# Patient Record
Sex: FEMALE | Race: WHITE | NOT HISPANIC OR LATINO | Employment: FULL TIME | URBAN - METROPOLITAN AREA
[De-identification: names, ages, dates, MRNs, and addresses within clinical notes are randomized per-mention and may not be internally consistent; named-entity substitution may affect disease eponyms.]

---

## 2017-01-20 ENCOUNTER — OFFICE VISIT - HEALTHEAST (OUTPATIENT)
Dept: PHYSICAL THERAPY | Facility: REHABILITATION | Age: 37
End: 2017-01-20

## 2017-01-20 DIAGNOSIS — R29.898 DECREASED RANGE OF MOTION OF NECK: ICD-10-CM

## 2017-01-20 DIAGNOSIS — M54.2 CERVICAL SPINE PAIN: ICD-10-CM

## 2017-02-03 ENCOUNTER — COMMUNICATION - HEALTHEAST (OUTPATIENT)
Dept: FAMILY MEDICINE | Facility: CLINIC | Age: 37
End: 2017-02-03

## 2017-02-04 ENCOUNTER — OFFICE VISIT - HEALTHEAST (OUTPATIENT)
Dept: FAMILY MEDICINE | Facility: CLINIC | Age: 37
End: 2017-02-04

## 2017-02-04 DIAGNOSIS — J02.9 PHARYNGITIS: ICD-10-CM

## 2017-02-04 DIAGNOSIS — H83.03 INFECTION OF THE INNER EAR, BILATERAL: ICD-10-CM

## 2017-02-04 DIAGNOSIS — R52 BODY ACHES: ICD-10-CM

## 2017-02-04 DIAGNOSIS — R11.0 NAUSEA: ICD-10-CM

## 2017-02-04 DIAGNOSIS — R07.0 THROAT PAIN: ICD-10-CM

## 2017-04-15 ENCOUNTER — COMMUNICATION - HEALTHEAST (OUTPATIENT)
Dept: FAMILY MEDICINE | Facility: CLINIC | Age: 37
End: 2017-04-15

## 2017-04-15 DIAGNOSIS — F33.1 MODERATE EPISODE OF RECURRENT MAJOR DEPRESSIVE DISORDER (H): ICD-10-CM

## 2017-11-28 ENCOUNTER — AMBULATORY - HEALTHEAST (OUTPATIENT)
Dept: NURSING | Facility: CLINIC | Age: 37
End: 2017-11-28

## 2017-11-28 DIAGNOSIS — Z23 NEED FOR VACCINATION: ICD-10-CM

## 2017-12-26 LAB
HUMAN PAPILLOMA VIRUS 16 DNA: POSITIVE
HUMAN PAPILLOMA VIRUS 18 DNA: NEGATIVE
HUMAN PAPILLOMA VIRUS FINAL DIAGNOSIS: ABNORMAL
HUMAN PAPILLOMA VIRUS OTHER HR: NEGATIVE
SPECIMEN DESCRIPTION: ABNORMAL

## 2018-01-02 ENCOUNTER — RECORDS - HEALTHEAST (OUTPATIENT)
Dept: ADMINISTRATIVE | Facility: OTHER | Age: 38
End: 2018-01-02

## 2018-01-02 LAB
BKR LAB AP ABNORMAL BLEEDING: NO
BKR LAB AP BIRTH CONTROL/HORMONES: NORMAL
BKR LAB AP CERVICAL APPEARANCE: NORMAL
BKR LAB AP GYN ADEQUACY: NORMAL
BKR LAB AP GYN INTERPRETATION: NORMAL
BKR LAB AP HPV REFLEX: NORMAL
BKR LAB AP LMP: NORMAL
BKR LAB AP PATIENT STATUS: NORMAL
BKR LAB AP PREVIOUS ABNORMAL: NORMAL
BKR LAB AP PREVIOUS NORMAL: 2013
HIGH RISK?: NO
PATH REPORT.COMMENTS IMP SPEC: NORMAL
RESULT FLAG (HE HISTORICAL CONVERSION): NORMAL

## 2018-01-21 ENCOUNTER — COMMUNICATION - HEALTHEAST (OUTPATIENT)
Dept: FAMILY MEDICINE | Facility: CLINIC | Age: 38
End: 2018-01-21

## 2018-01-21 DIAGNOSIS — F33.1 MODERATE EPISODE OF RECURRENT MAJOR DEPRESSIVE DISORDER (H): ICD-10-CM

## 2018-07-28 ENCOUNTER — COMMUNICATION - HEALTHEAST (OUTPATIENT)
Dept: FAMILY MEDICINE | Facility: CLINIC | Age: 38
End: 2018-07-28

## 2018-07-28 DIAGNOSIS — F33.1 MODERATE EPISODE OF RECURRENT MAJOR DEPRESSIVE DISORDER (H): ICD-10-CM

## 2018-07-30 ENCOUNTER — COMMUNICATION - HEALTHEAST (OUTPATIENT)
Dept: SCHEDULING | Facility: CLINIC | Age: 38
End: 2018-07-30

## 2018-08-13 ENCOUNTER — OFFICE VISIT - HEALTHEAST (OUTPATIENT)
Dept: FAMILY MEDICINE | Facility: CLINIC | Age: 38
End: 2018-08-13

## 2018-08-13 DIAGNOSIS — R07.89 CHEST TIGHTNESS: ICD-10-CM

## 2018-08-13 LAB
ATRIAL RATE - MUSE: 58 BPM
DIASTOLIC BLOOD PRESSURE - MUSE: NORMAL MMHG
ERYTHROCYTE [DISTWIDTH] IN BLOOD BY AUTOMATED COUNT: 10.4 % (ref 11–14.5)
HCT VFR BLD AUTO: 40.1 % (ref 35–47)
HGB BLD-MCNC: 13.4 G/DL (ref 12–16)
INTERPRETATION ECG - MUSE: NORMAL
MCH RBC QN AUTO: 32.3 PG (ref 27–34)
MCHC RBC AUTO-ENTMCNC: 33.5 G/DL (ref 32–36)
MCV RBC AUTO: 97 FL (ref 80–100)
P AXIS - MUSE: 5 DEGREES
PLATELET # BLD AUTO: 294 THOU/UL (ref 140–440)
PMV BLD AUTO: 7.2 FL (ref 7–10)
PR INTERVAL - MUSE: 140 MS
QRS DURATION - MUSE: 84 MS
QT - MUSE: 426 MS
QTC - MUSE: 418 MS
R AXIS - MUSE: 33 DEGREES
RBC # BLD AUTO: 4.15 MILL/UL (ref 3.8–5.4)
SYSTOLIC BLOOD PRESSURE - MUSE: NORMAL MMHG
T AXIS - MUSE: 12 DEGREES
VENTRICULAR RATE- MUSE: 58 BPM
WBC: 5.4 THOU/UL (ref 4–11)

## 2018-08-14 LAB
ALBUMIN SERPL-MCNC: 3.7 G/DL (ref 3.5–5)
ALP SERPL-CCNC: 38 U/L (ref 45–120)
ALT SERPL W P-5'-P-CCNC: 15 U/L (ref 0–45)
ANION GAP SERPL CALCULATED.3IONS-SCNC: 12 MMOL/L (ref 5–18)
AST SERPL W P-5'-P-CCNC: 20 U/L (ref 0–40)
BILIRUB SERPL-MCNC: 0.8 MG/DL (ref 0–1)
BUN SERPL-MCNC: 11 MG/DL (ref 8–22)
CALCIUM SERPL-MCNC: 9.6 MG/DL (ref 8.5–10.5)
CHLORIDE BLD-SCNC: 108 MMOL/L (ref 98–107)
CO2 SERPL-SCNC: 19 MMOL/L (ref 22–31)
CREAT SERPL-MCNC: 0.74 MG/DL (ref 0.6–1.1)
GFR SERPL CREATININE-BSD FRML MDRD: >60 ML/MIN/1.73M2
GLUCOSE BLD-MCNC: 87 MG/DL (ref 70–125)
LIPASE SERPL-CCNC: 35 U/L (ref 0–52)
POTASSIUM BLD-SCNC: 4 MMOL/L (ref 3.5–5)
PROT SERPL-MCNC: 7 G/DL (ref 6–8)
SODIUM SERPL-SCNC: 139 MMOL/L (ref 136–145)

## 2018-08-16 ENCOUNTER — COMMUNICATION - HEALTHEAST (OUTPATIENT)
Dept: SCHEDULING | Facility: CLINIC | Age: 38
End: 2018-08-16

## 2018-10-17 ENCOUNTER — AMBULATORY - HEALTHEAST (OUTPATIENT)
Dept: NURSING | Facility: CLINIC | Age: 38
End: 2018-10-17

## 2018-12-23 ENCOUNTER — COMMUNICATION - HEALTHEAST (OUTPATIENT)
Dept: FAMILY MEDICINE | Facility: CLINIC | Age: 38
End: 2018-12-23

## 2018-12-23 DIAGNOSIS — F33.1 MODERATE EPISODE OF RECURRENT MAJOR DEPRESSIVE DISORDER (H): ICD-10-CM

## 2019-01-04 ENCOUNTER — OFFICE VISIT - HEALTHEAST (OUTPATIENT)
Dept: FAMILY MEDICINE | Facility: CLINIC | Age: 39
End: 2019-01-04

## 2019-01-04 DIAGNOSIS — T30.4 CHEMICAL BURN: ICD-10-CM

## 2019-01-04 DIAGNOSIS — L20.9 ATOPIC DERMATITIS, UNSPECIFIED TYPE: ICD-10-CM

## 2019-01-23 ENCOUNTER — OFFICE VISIT - HEALTHEAST (OUTPATIENT)
Dept: FAMILY MEDICINE | Facility: CLINIC | Age: 39
End: 2019-01-23

## 2019-01-23 DIAGNOSIS — F33.1 MODERATE EPISODE OF RECURRENT MAJOR DEPRESSIVE DISORDER (H): ICD-10-CM

## 2019-01-23 DIAGNOSIS — L98.9 SKIN LESION: ICD-10-CM

## 2019-01-23 DIAGNOSIS — F51.04 PSYCHOPHYSIOLOGICAL INSOMNIA: ICD-10-CM

## 2019-01-23 NOTE — ASSESSMENT & PLAN NOTE
This patient has a long history of anxiety and depression and is on a high dose of fluoxetine.  For the past 6 months she has ruminative anxiety driven insomnia with poor sleep hygiene.  This is in the context of her family trying to conceive a child and being unsuccessful.  -We discussed sleep hygiene.  -Trial of trazodone, at least temporarily.  - If she is not realizing some benefit, I would recommend a change from fluoxetine to an alternative medication.  She has not been on sertraline (presumably for Loxitane was started as monotherapy because of a family history of this medicine being effective).
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
## 2019-02-22 ENCOUNTER — COMMUNICATION - HEALTHEAST (OUTPATIENT)
Dept: FAMILY MEDICINE | Facility: CLINIC | Age: 39
End: 2019-02-22

## 2019-04-11 ENCOUNTER — COMMUNICATION - HEALTHEAST (OUTPATIENT)
Dept: FAMILY MEDICINE | Facility: CLINIC | Age: 39
End: 2019-04-11

## 2019-04-11 DIAGNOSIS — F33.1 MODERATE EPISODE OF RECURRENT MAJOR DEPRESSIVE DISORDER (H): ICD-10-CM

## 2019-04-22 ENCOUNTER — COMMUNICATION - HEALTHEAST (OUTPATIENT)
Dept: FAMILY MEDICINE | Facility: CLINIC | Age: 39
End: 2019-04-22

## 2019-04-24 ENCOUNTER — COMMUNICATION - HEALTHEAST (OUTPATIENT)
Dept: FAMILY MEDICINE | Facility: CLINIC | Age: 39
End: 2019-04-24

## 2019-04-24 DIAGNOSIS — Z02.1 PRE-EMPLOYMENT HEALTH SCREENING EXAMINATION: ICD-10-CM

## 2019-05-29 ENCOUNTER — OFFICE VISIT - HEALTHEAST (OUTPATIENT)
Dept: FAMILY MEDICINE | Facility: CLINIC | Age: 39
End: 2019-05-29

## 2019-05-29 DIAGNOSIS — R10.9 ABDOMINAL PAIN: ICD-10-CM

## 2019-05-29 LAB
ALBUMIN UR-MCNC: NEGATIVE MG/DL
APPEARANCE UR: CLEAR
BILIRUB UR QL STRIP: NEGATIVE
COLOR UR AUTO: YELLOW
GLUCOSE UR STRIP-MCNC: NEGATIVE MG/DL
HGB UR QL STRIP: NEGATIVE
KETONES UR STRIP-MCNC: NEGATIVE MG/DL
LEUKOCYTE ESTERASE UR QL STRIP: NEGATIVE
NITRATE UR QL: NEGATIVE
PH UR STRIP: 7 [PH] (ref 5–8)
SP GR UR STRIP: 1.01 (ref 1–1.03)
UROBILINOGEN UR STRIP-ACNC: NORMAL

## 2019-06-21 ENCOUNTER — COMMUNICATION - HEALTHEAST (OUTPATIENT)
Dept: FAMILY MEDICINE | Facility: CLINIC | Age: 39
End: 2019-06-21

## 2019-10-22 ENCOUNTER — COMMUNICATION - HEALTHEAST (OUTPATIENT)
Dept: FAMILY MEDICINE | Facility: CLINIC | Age: 39
End: 2019-10-22

## 2019-10-22 DIAGNOSIS — L98.9 SKIN LESION: ICD-10-CM

## 2019-11-05 ENCOUNTER — RECORDS - HEALTHEAST (OUTPATIENT)
Dept: ADMINISTRATIVE | Facility: OTHER | Age: 39
End: 2019-11-05

## 2019-11-26 ENCOUNTER — HOSPITAL ENCOUNTER (OUTPATIENT)
Dept: RADIOLOGY | Facility: CLINIC | Age: 39
Discharge: HOME | End: 2019-11-26
Attending: OBSTETRICS & GYNECOLOGY | Admitting: RADIOLOGY

## 2019-11-26 DIAGNOSIS — N97.9 INFERTILITY, FEMALE: ICD-10-CM

## 2020-03-02 ENCOUNTER — OFFICE VISIT - HEALTHEAST (OUTPATIENT)
Dept: FAMILY MEDICINE | Facility: CLINIC | Age: 40
End: 2020-03-02

## 2020-03-02 DIAGNOSIS — R10.30 LOWER ABDOMINAL PAIN: ICD-10-CM

## 2020-03-02 ASSESSMENT — PATIENT HEALTH QUESTIONNAIRE - PHQ9: SUM OF ALL RESPONSES TO PHQ QUESTIONS 1-9: 7

## 2020-03-10 ENCOUNTER — COMMUNICATION - HEALTHEAST (OUTPATIENT)
Dept: FAMILY MEDICINE | Facility: CLINIC | Age: 40
End: 2020-03-10

## 2020-03-10 DIAGNOSIS — L30.9 DERMATITIS: ICD-10-CM

## 2020-06-22 ENCOUNTER — COMMUNICATION - HEALTHEAST (OUTPATIENT)
Dept: FAMILY MEDICINE | Facility: CLINIC | Age: 40
End: 2020-06-22

## 2020-06-22 DIAGNOSIS — F33.1 MODERATE EPISODE OF RECURRENT MAJOR DEPRESSIVE DISORDER (H): ICD-10-CM

## 2020-07-28 ENCOUNTER — COMMUNICATION - HEALTHEAST (OUTPATIENT)
Dept: SCHEDULING | Facility: CLINIC | Age: 40
End: 2020-07-28

## 2020-10-24 ENCOUNTER — AMBULATORY - HEALTHEAST (OUTPATIENT)
Dept: NURSING | Facility: CLINIC | Age: 40
End: 2020-10-24

## 2021-01-25 ENCOUNTER — COMMUNICATION - HEALTHEAST (OUTPATIENT)
Dept: SCHEDULING | Facility: CLINIC | Age: 41
End: 2021-01-25

## 2021-01-27 ENCOUNTER — OFFICE VISIT - HEALTHEAST (OUTPATIENT)
Dept: FAMILY MEDICINE | Facility: CLINIC | Age: 41
End: 2021-01-27

## 2021-01-27 ENCOUNTER — AMBULATORY - HEALTHEAST (OUTPATIENT)
Dept: FAMILY MEDICINE | Facility: CLINIC | Age: 41
End: 2021-01-27

## 2021-01-27 DIAGNOSIS — Z20.822 SUSPECTED 2019 NOVEL CORONAVIRUS INFECTION: ICD-10-CM

## 2021-01-27 ASSESSMENT — ANXIETY QUESTIONNAIRES
6. BECOMING EASILY ANNOYED OR IRRITABLE: NOT AT ALL
2. NOT BEING ABLE TO STOP OR CONTROL WORRYING: NOT AT ALL
4. TROUBLE RELAXING: NOT AT ALL
5. BEING SO RESTLESS THAT IT IS HARD TO SIT STILL: NOT AT ALL
3. WORRYING TOO MUCH ABOUT DIFFERENT THINGS: NOT AT ALL
1. FEELING NERVOUS, ANXIOUS, OR ON EDGE: NOT AT ALL
7. FEELING AFRAID AS IF SOMETHING AWFUL MIGHT HAPPEN: NOT AT ALL
GAD7 TOTAL SCORE: 0

## 2021-01-27 ASSESSMENT — PATIENT HEALTH QUESTIONNAIRE - PHQ9: SUM OF ALL RESPONSES TO PHQ QUESTIONS 1-9: 1

## 2021-01-28 ENCOUNTER — COMMUNICATION - HEALTHEAST (OUTPATIENT)
Dept: SCHEDULING | Facility: CLINIC | Age: 41
End: 2021-01-28

## 2021-01-28 ENCOUNTER — COMMUNICATION - HEALTHEAST (OUTPATIENT)
Dept: FAMILY MEDICINE | Facility: CLINIC | Age: 41
End: 2021-01-28

## 2021-01-28 LAB
SARS-COV-2 PCR COMMENT: ABNORMAL
SARS-COV-2 RNA SPEC QL NAA+PROBE: POSITIVE
SARS-COV-2 VIRUS SPECIMEN SOURCE: ABNORMAL

## 2021-01-29 ENCOUNTER — OFFICE VISIT - HEALTHEAST (OUTPATIENT)
Dept: FAMILY MEDICINE | Facility: CLINIC | Age: 41
End: 2021-01-29

## 2021-01-29 DIAGNOSIS — F33.1 MODERATE EPISODE OF RECURRENT MAJOR DEPRESSIVE DISORDER (H): ICD-10-CM

## 2021-01-29 DIAGNOSIS — F51.04 PSYCHOPHYSIOLOGICAL INSOMNIA: ICD-10-CM

## 2021-01-29 DIAGNOSIS — F10.230 ALCOHOL DEPENDENCE WITH UNCOMPLICATED WITHDRAWAL (H): ICD-10-CM

## 2021-01-29 NOTE — ASSESSMENT & PLAN NOTE
This patient is in withdrawal from alcohol.  She had been drinking at least 7 drinks of vodka every other day for the past couple of months.  Most recent consumption approximately 48 hours.  She also is COVID-19 at this time.  She is planning on starting some form of outpatient chemical dependency therapy early next week.  She is looking for some assistance with cravings and asked specifically about naltrexone.  She also feels as though she has very little motivation.  We discussed that this is likely multifactorial.  He has been taking Prozac and unclear if this is been helpful.  She understands that she is at a an extremely high dose of fluoxetine at this time.  -No referrals were placed for chemical dependency in part because the patient already has her own resources.  She needs additional assistance, close referral for chemical dependency counseling based on phone call or email.  -Discussed augmentation of her current mental health plan.  We will add Wellbutrin which hopefully will help with some cravings.  -We will also add naltrexone.  -2-week follow-up recommended  -Sleeplessness.  Likely associated with acute withdrawal.  Previously she is done well on trazodone.  We did discuss briefly that she may benefit from benzodiazepine class medications.  We will withhold these medicines for now based on patient's preference.  -We also discussed Antabuse and Campral as medicines that might help with maintenance.  We will revisit at a future visit.

## 2021-02-12 ENCOUNTER — OFFICE VISIT - HEALTHEAST (OUTPATIENT)
Dept: FAMILY MEDICINE | Facility: CLINIC | Age: 41
End: 2021-02-12

## 2021-02-12 DIAGNOSIS — F10.230 ALCOHOL DEPENDENCE WITH UNCOMPLICATED WITHDRAWAL (H): ICD-10-CM

## 2021-02-12 DIAGNOSIS — F33.1 MODERATE EPISODE OF RECURRENT MAJOR DEPRESSIVE DISORDER (H): ICD-10-CM

## 2021-02-12 DIAGNOSIS — F51.04 PSYCHOPHYSIOLOGICAL INSOMNIA: ICD-10-CM

## 2021-02-12 ASSESSMENT — ANXIETY QUESTIONNAIRES
3. WORRYING TOO MUCH ABOUT DIFFERENT THINGS: NOT AT ALL
4. TROUBLE RELAXING: NOT AT ALL
2. NOT BEING ABLE TO STOP OR CONTROL WORRYING: NOT AT ALL
7. FEELING AFRAID AS IF SOMETHING AWFUL MIGHT HAPPEN: NOT AT ALL
6. BECOMING EASILY ANNOYED OR IRRITABLE: NOT AT ALL
5. BEING SO RESTLESS THAT IT IS HARD TO SIT STILL: NOT AT ALL
GAD7 TOTAL SCORE: 0
1. FEELING NERVOUS, ANXIOUS, OR ON EDGE: NOT AT ALL

## 2021-02-12 ASSESSMENT — PATIENT HEALTH QUESTIONNAIRE - PHQ9: SUM OF ALL RESPONSES TO PHQ QUESTIONS 1-9: 2

## 2021-02-12 NOTE — ASSESSMENT & PLAN NOTE
This patient is doing well in comparison to when we spoke on 1/29.  She has had no alcohol since approximately 1/27 she is doing 12 hours of outpatient therapy per week (in the evenings through [facility]).  She has had one episode of nausea which might be attributed to naltrexone.  She thinks that Wellbutrin has helped improve clarity of thought.  We discussed the possibility that she might have some symptoms of excessive serotonergic activity as result of the high dose of fluoxetine.  -Trial fluoxetine at 60 mg.  20 mg capsules sent to pharmacy to help facilitate the slow downward dosing of fluoxetine.  -Continue Wellbutrin SR at 100 mg twice daily.  -Take naltrexone once daily.  She will try taking 50 mg and continue.  She believes that this has helped with craving.  -Decreased use of trazodone.  Use as needed.

## 2021-02-15 ENCOUNTER — COMMUNICATION - HEALTHEAST (OUTPATIENT)
Dept: FAMILY MEDICINE | Facility: CLINIC | Age: 41
End: 2021-02-15

## 2021-02-15 DIAGNOSIS — F10.230 ALCOHOL DEPENDENCE WITH UNCOMPLICATED WITHDRAWAL (H): ICD-10-CM

## 2021-02-15 DIAGNOSIS — F33.1 MODERATE EPISODE OF RECURRENT MAJOR DEPRESSIVE DISORDER (H): ICD-10-CM

## 2021-02-15 DIAGNOSIS — F51.04 PSYCHOPHYSIOLOGICAL INSOMNIA: ICD-10-CM

## 2021-02-20 ENCOUNTER — COMMUNICATION - HEALTHEAST (OUTPATIENT)
Dept: FAMILY MEDICINE | Facility: CLINIC | Age: 41
End: 2021-02-20

## 2021-02-20 DIAGNOSIS — F10.230 ALCOHOL DEPENDENCE WITH UNCOMPLICATED WITHDRAWAL (H): ICD-10-CM

## 2021-02-20 DIAGNOSIS — F33.1 MODERATE EPISODE OF RECURRENT MAJOR DEPRESSIVE DISORDER (H): ICD-10-CM

## 2021-02-20 DIAGNOSIS — F51.04 PSYCHOPHYSIOLOGICAL INSOMNIA: ICD-10-CM

## 2021-03-24 ENCOUNTER — COMMUNICATION - HEALTHEAST (OUTPATIENT)
Dept: FAMILY MEDICINE | Facility: CLINIC | Age: 41
End: 2021-03-24

## 2021-03-24 DIAGNOSIS — F10.230 ALCOHOL DEPENDENCE WITH UNCOMPLICATED WITHDRAWAL (H): ICD-10-CM

## 2021-03-24 DIAGNOSIS — F33.1 MODERATE EPISODE OF RECURRENT MAJOR DEPRESSIVE DISORDER (H): ICD-10-CM

## 2021-03-24 DIAGNOSIS — F51.04 PSYCHOPHYSIOLOGICAL INSOMNIA: ICD-10-CM

## 2021-03-30 ENCOUNTER — OFFICE VISIT - HEALTHEAST (OUTPATIENT)
Dept: FAMILY MEDICINE | Facility: CLINIC | Age: 41
End: 2021-03-30

## 2021-03-30 ENCOUNTER — COMMUNICATION - HEALTHEAST (OUTPATIENT)
Dept: FAMILY MEDICINE | Facility: CLINIC | Age: 41
End: 2021-03-30

## 2021-03-30 DIAGNOSIS — F10.230 ALCOHOL DEPENDENCE WITH UNCOMPLICATED WITHDRAWAL (H): ICD-10-CM

## 2021-03-30 DIAGNOSIS — F33.1 MODERATE EPISODE OF RECURRENT MAJOR DEPRESSIVE DISORDER (H): ICD-10-CM

## 2021-03-30 DIAGNOSIS — F51.04 PSYCHOPHYSIOLOGICAL INSOMNIA: ICD-10-CM

## 2021-03-30 ASSESSMENT — ANXIETY QUESTIONNAIRES
GAD7 TOTAL SCORE: 12
1. FEELING NERVOUS, ANXIOUS, OR ON EDGE: MORE THAN HALF THE DAYS
6. BECOMING EASILY ANNOYED OR IRRITABLE: NEARLY EVERY DAY
3. WORRYING TOO MUCH ABOUT DIFFERENT THINGS: MORE THAN HALF THE DAYS
7. FEELING AFRAID AS IF SOMETHING AWFUL MIGHT HAPPEN: SEVERAL DAYS
2. NOT BEING ABLE TO STOP OR CONTROL WORRYING: MORE THAN HALF THE DAYS
5. BEING SO RESTLESS THAT IT IS HARD TO SIT STILL: SEVERAL DAYS
4. TROUBLE RELAXING: SEVERAL DAYS

## 2021-03-30 ASSESSMENT — PATIENT HEALTH QUESTIONNAIRE - PHQ9: SUM OF ALL RESPONSES TO PHQ QUESTIONS 1-9: 10

## 2021-03-30 NOTE — ASSESSMENT & PLAN NOTE
Anxious symptoms have been worse recently with more cravings.  She thinks that she has more energy and attributes this to the decreaseof the fluoxetine dose (was 80 mg and is currently 60 mg).  -Continue flecainide 60 mg.  -Discontinue Wellbutrin SR.  Start Wellbutrin  mg.  -Continue plan for maintenance of sobriety.  -Follow-up in 1 month?

## 2021-03-30 NOTE — ASSESSMENT & PLAN NOTE
"She continues to work to maintain sobriety.  She describes having \"1 slip up\" but she is back at maintaining sobriety.  Continue treatment.  Continue naltrexone.  Unclear benefit of bupropion as relates to cravings.  Cravings have been higher recently as she goes further into her treatment plan.  "

## 2021-04-21 ENCOUNTER — COMMUNICATION - HEALTHEAST (OUTPATIENT)
Dept: FAMILY MEDICINE | Facility: CLINIC | Age: 41
End: 2021-04-21

## 2021-04-21 DIAGNOSIS — F33.1 MODERATE EPISODE OF RECURRENT MAJOR DEPRESSIVE DISORDER (H): ICD-10-CM

## 2021-04-21 DIAGNOSIS — F10.230 ALCOHOL DEPENDENCE WITH UNCOMPLICATED WITHDRAWAL (H): ICD-10-CM

## 2021-05-03 ENCOUNTER — TRANSFERRED RECORDS (OUTPATIENT)
Dept: HEALTH INFORMATION MANAGEMENT | Facility: CLINIC | Age: 41
End: 2021-05-03

## 2021-05-17 ENCOUNTER — COMMUNICATION - HEALTHEAST (OUTPATIENT)
Dept: FAMILY MEDICINE | Facility: CLINIC | Age: 41
End: 2021-05-17

## 2021-05-17 DIAGNOSIS — F10.230 ALCOHOL DEPENDENCE WITH UNCOMPLICATED WITHDRAWAL (H): ICD-10-CM

## 2021-05-17 DIAGNOSIS — F33.1 MODERATE EPISODE OF RECURRENT MAJOR DEPRESSIVE DISORDER (H): ICD-10-CM

## 2021-05-27 ASSESSMENT — PATIENT HEALTH QUESTIONNAIRE - PHQ9
SUM OF ALL RESPONSES TO PHQ QUESTIONS 1-9: 1
SUM OF ALL RESPONSES TO PHQ QUESTIONS 1-9: 7
SUM OF ALL RESPONSES TO PHQ QUESTIONS 1-9: 2
SUM OF ALL RESPONSES TO PHQ QUESTIONS 1-9: 10

## 2021-05-27 NOTE — TELEPHONE ENCOUNTER
Refill Approved    Rx renewed per Medication Renewal Policy. Medication was last renewed on 12/31/18.    Thalia Quach, [facility] Triage/Med Refill 4/11/2019     Requested Prescriptions   Pending Prescriptions Disp Refills     FLUoxetine (PROZAC) 40 MG capsule [Pharmacy Med Name: FLUOXETINE HCL 40 MG CAPSULE] 60 capsule 0     Sig: TAKE 2 CAPSULES (80 MG TOTAL) BY MOUTH DAILY. CLINIC VISIT REQUIRED BEFORE ADDITIONAL REFILLS.       SSRI Refill Protocol  Passed - 4/11/2019  2:31 AM        Passed - PCP or prescribing provider visit in last year     Last office visit with prescriber/PCP: 1/23/2019 Mk Valenzuela MD OR same dept: 1/23/2019 Mk Valenzuela MD OR same specialty: 1/23/2019 Mk Valenzuela MD  Last physical: Visit date not found Last MTM visit: Visit date not found   Next visit within 3 mo: Visit date not found  Next physical within 3 mo: Visit date not found  Prescriber OR PCP: Mk Valenzuela MD  Last diagnosis associated with med order: 1. Moderate episode of recurrent major depressive disorder (H)  - FLUoxetine (PROZAC) 40 MG capsule [Pharmacy Med Name: FLUOXETINE HCL 40 MG CAPSULE]; Take 2 capsules (80 mg total) by mouth daily. Clinic visit required before additional refills.  Dispense: 60 capsule; Refill: 0    If protocol passes may refill for 12 months if within 3 months of last provider visit (or a total of 15 months).

## 2021-05-28 ASSESSMENT — ANXIETY QUESTIONNAIRES
GAD7 TOTAL SCORE: 0
GAD7 TOTAL SCORE: 0
GAD7 TOTAL SCORE: 12

## 2021-05-29 NOTE — PROGRESS NOTES
"Assessment/Plan:    Leila was seen today for abdominal pain.    Diagnoses and all orders for this visit:    Abdominal pain: Symptoms are of short duration and have not been present today, at least not in the same character.  Mild tenderness in that area on exam which could be consistent with muscular etiology.  Urinalysis without abnormalities.  The patient is due for her.  Later today.  Discussed bowel movement hygiene does not appear to be comorbid constipation.  Differential: Muscular etiology versus gynecologic (ovaries?)  Versus other.  If not improving, consider transvaginal ultrasound to evaluate the ovaries the pelvic anatomy.  Pelvic floor dysfunction is also on the differential.  The patient said that if her symptoms recur she will see her gynecologist for an in-depth gynecologic exam.  -     Urinalysis-UC if Indicated       Return in about 1 month (around 6/26/2019) for recheck if not improving.    Mk Valenzuela MD  _______________________________    Chief Complaint   Patient presents with     Abdominal Pain     \" off and on\" pt notices when she has a full bladder x 1.5 weeks      Subjective: Leila Muir is a 38 y.o. year old female who I have seen in clinic before who presents with the following acute complaint(s):    Cramping sensation:   - worse with full bladder.  Relieved with voiding. No dysuria.  No urinary urgency or frequency.  Similar sensation with BM.  Left lower quadrant pain.  BMs: twice per day.  \"Normal\"  [place] type IV.  No symptoms today.  No recent antibiotics.  No fevers, no chills.  No related to menstrual cycle.      ROS: Complete review of systems obtained.  Pertinent items are listed above.     The following portions of the patient's history were reviewed and updated as appropriate: allergies, current medications, past medical history and problem list.     Objective:   /62 (Patient Site: Left Arm, Patient Position: Sitting, Cuff Size: Adult Regular)   Pulse 96 "   Temp 97.8  F (36.6  C) (Oral)   Resp 18   Wt 158 lb (71.7 kg)   LMP 04/30/2019   SpO2 98% Comment: room air  Breastfeeding? No   BMI 25.50 kg/m    General: No acute distress  Cardiac: Regular rate and rhythm, normal S1/S2, no murmurs of the gallops  Respiratory: Clear to auscultation bilaterally.  Abdomen: Soft, nondistended.  No hepatosplenomegaly.  Left lower quadrant has mild tenderness just medial to the iliac crest with deep palpation.  No palpable mass or abnormality.  No rebound tenderness.  Pain is slightly less when she engages her abdominal wall muscles and I palpate.  No suprapubic tenderness.  No right lower quadrant tenderness.    Recent Results (from the past 24 hour(s))   Urinalysis-UC if Indicated   Result Value Ref Range    Color, UA Yellow Colorless, Yellow, Straw, Light Yellow    Clarity, UA Clear Clear    Glucose, UA Negative Negative    Bilirubin, UA Negative Negative    Ketones, UA Negative Negative    Specific Gravity, UA 1.015 1.005 - 1.030    Blood, UA Negative Negative    pH, UA 7.0 5.0 - 8.0    Protein, UA Negative Negative mg/dL    Urobilinogen, UA 0.2 E.U./dL 0.2 E.U./dL, 1.0 E.U./dL    Nitrite, UA Negative Negative    Leukocytes, UA Negative Negative     No results found.    Additional History from Old Records Summarized (2): no  Decision to Obtain Records (1): no  Radiology Tests Summarized or Ordered (1): no  Labs Reviewed or Ordered (1): yes  Medicine Test Summarized or Ordered (1): no  Independent Review of EKG or X-RAY(2 each): no    This note has been dictated using voice recognition software. Any grammatical or context distortions are unintentional and inherent to the software

## 2021-05-29 NOTE — TELEPHONE ENCOUNTER
Pt was seen today by PCP for an unrelated issue and did not request any of the testing she previously wanted.    Pended orders D/C'd and encounter closed.

## 2021-05-30 VITALS — WEIGHT: 159.7 LBS | BODY MASS INDEX: 25.78 KG/M2

## 2021-06-01 VITALS — BODY MASS INDEX: 24.86 KG/M2 | WEIGHT: 154 LBS

## 2021-06-02 VITALS — BODY MASS INDEX: 25.5 KG/M2 | WEIGHT: 158 LBS

## 2021-06-03 VITALS — BODY MASS INDEX: 25.5 KG/M2 | WEIGHT: 158 LBS

## 2021-06-04 ENCOUNTER — APPOINTMENT (OUTPATIENT)
Dept: CT IMAGING | Facility: CLINIC | Age: 41
End: 2021-06-04
Attending: EMERGENCY MEDICINE
Payer: COMMERCIAL

## 2021-06-04 ENCOUNTER — HOSPITAL ENCOUNTER (EMERGENCY)
Facility: CLINIC | Age: 41
Discharge: HOME | End: 2021-06-04
Attending: EMERGENCY MEDICINE | Admitting: EMERGENCY MEDICINE
Payer: COMMERCIAL

## 2021-06-04 VITALS
HEIGHT: 66 IN | SYSTOLIC BLOOD PRESSURE: 104 MMHG | OXYGEN SATURATION: 97 % | WEIGHT: 150 LBS | BODY MASS INDEX: 24.11 KG/M2 | DIASTOLIC BLOOD PRESSURE: 68 MMHG | TEMPERATURE: 99 F | RESPIRATION RATE: 16 BRPM | HEART RATE: 79 BPM

## 2021-06-04 VITALS
WEIGHT: 158 LBS | DIASTOLIC BLOOD PRESSURE: 60 MMHG | BODY MASS INDEX: 25.5 KG/M2 | HEART RATE: 76 BPM | SYSTOLIC BLOOD PRESSURE: 114 MMHG

## 2021-06-04 DIAGNOSIS — Z87.898 HISTORY OF ALCOHOL USE DISORDER: ICD-10-CM

## 2021-06-04 DIAGNOSIS — K57.92 ACUTE DIVERTICULITIS: ICD-10-CM

## 2021-06-04 DIAGNOSIS — R10.12 LUQ ABDOMINAL PAIN: ICD-10-CM

## 2021-06-04 LAB
ALBUMIN SERPL-MCNC: 3.7 G/DL (ref 3.4–5)
ALBUMIN UR-MCNC: NEGATIVE MG/DL
ALP SERPL-CCNC: 63 U/L (ref 40–150)
ALT SERPL W P-5'-P-CCNC: 18 U/L (ref 0–50)
ANION GAP SERPL CALCULATED.3IONS-SCNC: 6 MMOL/L (ref 3–14)
APPEARANCE UR: CLEAR
AST SERPL W P-5'-P-CCNC: 10 U/L (ref 0–45)
BASOPHILS # BLD AUTO: 0 10E9/L (ref 0–0.2)
BASOPHILS NFR BLD AUTO: 0.2 %
BILIRUB SERPL-MCNC: 0.6 MG/DL (ref 0.2–1.3)
BILIRUB UR QL STRIP: NEGATIVE
BUN SERPL-MCNC: 15 MG/DL (ref 7–30)
CALCIUM SERPL-MCNC: 8.8 MG/DL (ref 8.5–10.1)
CHLORIDE SERPL-SCNC: 106 MMOL/L (ref 94–109)
CO2 SERPL-SCNC: 26 MMOL/L (ref 20–32)
COLOR UR AUTO: YELLOW
CREAT SERPL-MCNC: 0.74 MG/DL (ref 0.52–1.04)
DIFFERENTIAL METHOD BLD: ABNORMAL
EOSINOPHIL # BLD AUTO: 0.1 10E9/L (ref 0–0.7)
EOSINOPHIL NFR BLD AUTO: 0.5 %
ERYTHROCYTE [DISTWIDTH] IN BLOOD BY AUTOMATED COUNT: 12.1 % (ref 10–15)
GFR SERPL CREATININE-BSD FRML MDRD: >90 ML/MIN/{1.73_M2}
GLUCOSE SERPL-MCNC: 102 MG/DL (ref 70–99)
GLUCOSE UR STRIP-MCNC: NEGATIVE MG/DL
HCG UR QL: NEGATIVE
HCT VFR BLD AUTO: 38.8 % (ref 35–47)
HGB BLD-MCNC: 13.2 G/DL (ref 11.7–15.7)
HGB UR QL STRIP: NEGATIVE
IMM GRANULOCYTES # BLD: 0.3 10E9/L (ref 0–0.4)
IMM GRANULOCYTES NFR BLD: 1.6 %
KETONES UR STRIP-MCNC: NEGATIVE MG/DL
LEUKOCYTE ESTERASE UR QL STRIP: NEGATIVE
LIPASE SERPL-CCNC: 144 U/L (ref 73–393)
LYMPHOCYTES # BLD AUTO: 1.3 10E9/L (ref 0.8–5.3)
LYMPHOCYTES NFR BLD AUTO: 8.2 %
MCH RBC QN AUTO: 31.4 PG (ref 26.5–33)
MCHC RBC AUTO-ENTMCNC: 34 G/DL (ref 31.5–36.5)
MCV RBC AUTO: 92 FL (ref 78–100)
MONOCYTES # BLD AUTO: 1.3 10E9/L (ref 0–1.3)
MONOCYTES NFR BLD AUTO: 8.1 %
NEUTROPHILS # BLD AUTO: 13.1 10E9/L (ref 1.6–8.3)
NEUTROPHILS NFR BLD AUTO: 81.4 %
NITRATE UR QL: NEGATIVE
NRBC # BLD AUTO: 0 10*3/UL
NRBC BLD AUTO-RTO: 0 /100
PH UR STRIP: 6 PH (ref 5–7)
PLATELET # BLD AUTO: 338 10E9/L (ref 150–450)
POTASSIUM SERPL-SCNC: 3.9 MMOL/L (ref 3.4–5.3)
PROT SERPL-MCNC: 7.1 G/DL (ref 6.8–8.8)
RBC # BLD AUTO: 4.2 10E12/L (ref 3.8–5.2)
SODIUM SERPL-SCNC: 138 MMOL/L (ref 133–144)
SOURCE: NORMAL
SP GR UR STRIP: 1.01 (ref 1–1.03)
TROPONIN I SERPL-MCNC: <0.015 UG/L (ref 0–0.04)
UROBILINOGEN UR STRIP-MCNC: 0 MG/DL (ref 0–2)
WBC # BLD AUTO: 16.1 10E9/L (ref 4–11)

## 2021-06-04 PROCEDURE — 250N000013 HC RX MED GY IP 250 OP 250 PS 637: Performed by: EMERGENCY MEDICINE

## 2021-06-04 PROCEDURE — 74177 CT ABD & PELVIS W/CONTRAST: CPT

## 2021-06-04 PROCEDURE — 250N000011 HC RX IP 250 OP 636: Performed by: EMERGENCY MEDICINE

## 2021-06-04 PROCEDURE — 85025 COMPLETE CBC W/AUTO DIFF WBC: CPT | Performed by: EMERGENCY MEDICINE

## 2021-06-04 PROCEDURE — 96366 THER/PROPH/DIAG IV INF ADDON: CPT | Performed by: EMERGENCY MEDICINE

## 2021-06-04 PROCEDURE — 258N000003 HC RX IP 258 OP 636: Performed by: EMERGENCY MEDICINE

## 2021-06-04 PROCEDURE — 250N000009 HC RX 250: Performed by: EMERGENCY MEDICINE

## 2021-06-04 PROCEDURE — 81025 URINE PREGNANCY TEST: CPT | Performed by: EMERGENCY MEDICINE

## 2021-06-04 PROCEDURE — 83690 ASSAY OF LIPASE: CPT | Performed by: EMERGENCY MEDICINE

## 2021-06-04 PROCEDURE — 96365 THER/PROPH/DIAG IV INF INIT: CPT | Mod: 59 | Performed by: EMERGENCY MEDICINE

## 2021-06-04 PROCEDURE — 96375 TX/PRO/DX INJ NEW DRUG ADDON: CPT | Performed by: EMERGENCY MEDICINE

## 2021-06-04 PROCEDURE — 84484 ASSAY OF TROPONIN QUANT: CPT | Performed by: EMERGENCY MEDICINE

## 2021-06-04 PROCEDURE — 80053 COMPREHEN METABOLIC PANEL: CPT | Performed by: EMERGENCY MEDICINE

## 2021-06-04 PROCEDURE — 81003 URINALYSIS AUTO W/O SCOPE: CPT | Performed by: EMERGENCY MEDICINE

## 2021-06-04 PROCEDURE — 99285 EMERGENCY DEPT VISIT HI MDM: CPT | Performed by: EMERGENCY MEDICINE

## 2021-06-04 PROCEDURE — 99285 EMERGENCY DEPT VISIT HI MDM: CPT | Mod: 25 | Performed by: EMERGENCY MEDICINE

## 2021-06-04 RX ORDER — SODIUM CHLORIDE 9 MG/ML
INJECTION, SOLUTION INTRAVENOUS CONTINUOUS
Status: DISCONTINUED | OUTPATIENT
Start: 2021-06-04 | End: 2021-06-04 | Stop reason: HOSPADM

## 2021-06-04 RX ORDER — NALTREXONE HYDROCHLORIDE 50 MG/1
50 TABLET, FILM COATED ORAL DAILY
COMMUNITY
End: 2021-08-02

## 2021-06-04 RX ORDER — IOPAMIDOL 755 MG/ML
74 INJECTION, SOLUTION INTRAVASCULAR ONCE
Status: COMPLETED | OUTPATIENT
Start: 2021-06-04 | End: 2021-06-04

## 2021-06-04 RX ORDER — KETOROLAC TROMETHAMINE 15 MG/ML
10 INJECTION, SOLUTION INTRAMUSCULAR; INTRAVENOUS
Status: COMPLETED | OUTPATIENT
Start: 2021-06-04 | End: 2021-06-04

## 2021-06-04 RX ORDER — ACETAMINOPHEN 500 MG
1000 TABLET ORAL ONCE
Status: COMPLETED | OUTPATIENT
Start: 2021-06-04 | End: 2021-06-04

## 2021-06-04 RX ORDER — KETOROLAC TROMETHAMINE 10 MG/1
10 TABLET, FILM COATED ORAL EVERY 6 HOURS PRN
Qty: 10 TABLET | Refills: 0 | Status: SHIPPED | OUTPATIENT
Start: 2021-06-04 | End: 2021-08-02

## 2021-06-04 RX ORDER — FLUOXETINE HYDROCHLORIDE 60 MG/1
60 TABLET, FILM COATED ORAL; ORAL DAILY
COMMUNITY
End: 2021-08-02

## 2021-06-04 RX ADMIN — FAMOTIDINE 40 MG: 20 INJECTION, SOLUTION INTRAVENOUS at 05:32

## 2021-06-04 RX ADMIN — SODIUM CHLORIDE 58 ML: 9 INJECTION, SOLUTION INTRAVENOUS at 07:39

## 2021-06-04 RX ADMIN — ACETAMINOPHEN 1000 MG: 500 TABLET, FILM COATED ORAL at 07:54

## 2021-06-04 RX ADMIN — LIDOCAINE HYDROCHLORIDE 30 ML: 20 SOLUTION ORAL; TOPICAL at 05:31

## 2021-06-04 RX ADMIN — KETOROLAC TROMETHAMINE 10 MG: 15 INJECTION, SOLUTION INTRAMUSCULAR; INTRAVENOUS at 08:26

## 2021-06-04 RX ADMIN — IOPAMIDOL 74 ML: 755 INJECTION, SOLUTION INTRAVENOUS at 07:38

## 2021-06-04 RX ADMIN — AMOXICILLIN AND CLAVULANATE POTASSIUM 1 TABLET: 875; 125 TABLET, FILM COATED ORAL at 08:27

## 2021-06-04 RX ADMIN — SODIUM CHLORIDE 1000 ML: 9 INJECTION, SOLUTION INTRAVENOUS at 05:19

## 2021-06-04 ASSESSMENT — ENCOUNTER SYMPTOMS
FEVER: 0
SHORTNESS OF BREATH: 0
VOMITING: 0
NAUSEA: 1
ABDOMINAL PAIN: 1

## 2021-06-04 ASSESSMENT — MIFFLIN-ST. JEOR: SCORE: 1372.15

## 2021-06-04 NOTE — ED PROVIDER NOTES
History     Chief Complaint   Patient presents with     Abdominal Pain     LUQ     HPI  Leila Muir is a 40 year old female who has past medical history significant for alcohol abuse, occasional marijuana use, anxiety, presenting to the emergency department from outpatient alcohol treatment facility for concerns regarding abdominal pain.  Abdominal pain began insidiously in the epigastric, and left upper quadrant region, with some radiation towards the back.  Patient had a normal dinner a couple hours prior.  She then began experiencing increased amounts of upper abdominal pain, with nausea without any vomiting.  Patient was feeling hot, sweaty, and nauseous.  Had normal bowel movement yesterday.  No lower abdominal discomfort.  Has had prior appendectomy.  Denies any urinary symptoms.  No significant right upper quadrant pains.  No chest pain, cough, or shortness of breath.    Allergies:  No Known Allergies    Problem List:    There are no active problems to display for this patient.       Past Medical History:    History reviewed. No pertinent past medical history.    Past Surgical History:    History reviewed. No pertinent surgical history.    Family History:    History reviewed. No pertinent family history.    Social History:  Marital Status:   [2]  Social History     Tobacco Use     Smoking status: Current Every Day Smoker     Packs/day: 0.50     Types: Cigarettes     Smokeless tobacco: Never Used   Substance Use Topics     Alcohol use: Yes     Comment: sober since 4/4/2021     Drug use: Never        Medications:    FLUoxetine HCl 60 MG TABS  naltrexone (DEPADE/REVIA) 50 MG tablet          Review of Systems   Constitutional: Negative for fever.   Respiratory: Negative for shortness of breath.    Cardiovascular: Negative for chest pain.   Gastrointestinal: Positive for abdominal pain and nausea. Negative for vomiting.   All other systems reviewed and are negative.      Physical Exam   BP:  "107/76  Pulse: 78  Temp: 99  F (37.2  C)  Resp: 16  Height: 167.6 cm (5' 6\")  Weight: 68 kg (150 lb)  SpO2: 98 %      Physical Exam  /76   Pulse 78   Temp 99  F (37.2  C) (Oral)   Resp 16   Ht 1.676 m (5' 6\")   Wt 68 kg (150 lb)   LMP 06/01/2021 (Exact Date)   SpO2 98%   BMI 24.21 kg/m    General: alert, interactive, in no apparent distress  Head: atraumatic  Nose: no rhinorrhea or epistaxis  Ears: no external auditory canal discharge or bleeding.    Eyes: Sclera nonicteric. Conjunctiva noninjected.    Mouth: no tonsillar erythema, edema, or exudate  Neck: supple, no palp LAD  Lungs: CTAB  CV: RRR, S1/S2; peripheral pulses palpable and symmetric  Abdomen: soft, tender in the LUQ of the abdomen and epigastric region, nd, no guarding or rebound. Positive bowel sounds  Extremities: no cyanosis or edema  Skin: no rash or diaphoresis  Neuro:  strength 5/5 in UE and LEs bilaterally, sensation intact to light touch in UE and LEs bilaterally;       ED Course        Procedures               Critical Care time:  none               Results for orders placed or performed during the hospital encounter of 06/04/21 (from the past 24 hour(s))   Comprehensive metabolic panel   Result Value Ref Range    Sodium 138 133 - 144 mmol/L    Potassium 3.9 3.4 - 5.3 mmol/L    Chloride 106 94 - 109 mmol/L    Carbon Dioxide 26 20 - 32 mmol/L    Anion Gap 6 3 - 14 mmol/L    Glucose 102 (H) 70 - 99 mg/dL    Urea Nitrogen 15 7 - 30 mg/dL    Creatinine 0.74 0.52 - 1.04 mg/dL    GFR Estimate >90 >60 mL/min/[1.73_m2]    GFR Estimate If Black >90 >60 mL/min/[1.73_m2]    Calcium 8.8 8.5 - 10.1 mg/dL    Bilirubin Total 0.6 0.2 - 1.3 mg/dL    Albumin 3.7 3.4 - 5.0 g/dL    Protein Total 7.1 6.8 - 8.8 g/dL    Alkaline Phosphatase 63 40 - 150 U/L    ALT 18 0 - 50 U/L    AST 10 0 - 45 U/L   Lipase   Result Value Ref Range    Lipase 144 73 - 393 U/L   CBC with platelets differential   Result Value Ref Range    WBC 16.1 (H) 4.0 - 11.0 10e9/L    " RBC Count 4.20 3.8 - 5.2 10e12/L    Hemoglobin 13.2 11.7 - 15.7 g/dL    Hematocrit 38.8 35.0 - 47.0 %    MCV 92 78 - 100 fl    MCH 31.4 26.5 - 33.0 pg    MCHC 34.0 31.5 - 36.5 g/dL    RDW 12.1 10.0 - 15.0 %    Platelet Count 338 150 - 450 10e9/L    Diff Method Automated Method     % Neutrophils 81.4 %    % Lymphocytes 8.2 %    % Monocytes 8.1 %    % Eosinophils 0.5 %    % Basophils 0.2 %    % Immature Granulocytes 1.6 %    Nucleated RBCs 0 0 /100    Absolute Neutrophil 13.1 (H) 1.6 - 8.3 10e9/L    Absolute Lymphocytes 1.3 0.8 - 5.3 10e9/L    Absolute Monocytes 1.3 0.0 - 1.3 10e9/L    Absolute Eosinophils 0.1 0.0 - 0.7 10e9/L    Absolute Basophils 0.0 0.0 - 0.2 10e9/L    Abs Immature Granulocytes 0.3 0 - 0.4 10e9/L    Absolute Nucleated RBC 0.0    Troponin I   Result Value Ref Range    Troponin I ES <0.015 0.000 - 0.045 ug/L       Medications   0.9% sodium chloride BOLUS (1,000 mLs Intravenous Started 6/4/21 0519)     Followed by   sodium chloride 0.9% infusion (0 mLs Intravenous Hold 6/4/21 0533)   lidocaine (XYLOCAINE) 2 % 15 mL, alum & mag hydroxide-simethicone (MAALOX) 15 mL GI Cocktail (30 mLs Oral Given 6/4/21 0531)   famotidine (PEPCID) infusion 40 mg (40 mg Intravenous Started 6/4/21 0532)       Assessments & Plan (with Medical Decision Making)  40 year old female with past medical history significant for alcohol abuse, currently in outpatient rehab, with no recent alcohol use over the past 32 days, presenting to the emergency department with concerns regarding epigastric and left upper quadrant abdominal pain.  Symptoms began approximately 8 hours prior to ED arrival.  Patient nauseous, with ongoing pains.  She is afebrile, normal vitals upon arrival.  Differential includes gastritis, GERD, peptic ulcer, pancreatitis, cholecystitis, biliary colic.    IV established, labs drawn.  White blood cell count elevated at 16.1.  Remainder of labs unremarkable.  Lipase normal.  Uncertain exact cause of patient's  symptoms at this time.  Patient has been signed out to oncoming provider pending recheck of abdominal exam.  No right upper quadrant tenderness to suggest cholecystitis/biliary colic at this point.    Patient signed out pending recheck of symptoms.     I have reviewed the nursing notes.    I have reviewed the findings, diagnosis, plan and need for follow up with the patient.       New Prescriptions    No medications on file       Final diagnoses:   LUQ abdominal pain       6/4/2021   [facility] EMERGENCY DEPT     AvniJustin feliciano MD  06/04/21 0612

## 2021-06-04 NOTE — ED TRIAGE NOTES
Pt presents by ambulance from [facility] with LUUQ abdominal pain that began last night before bed. Pt tought maybe she needed to have a bowel movement, but nothing happened. No urinary trouble. One episode of vomiting. Denies nausea upon arrival. Pain is constant 6/10 to LUQ. Abd surgival hx: appendectomy. Has been at [facility] for 30 days due to alcohol abuse. No other drug use hx. Sober as of 4/4/21.

## 2021-06-04 NOTE — ED PROVIDER NOTES
Emergency Department Patient Sign-out       Brief HPI:  This is a 40 year old female signed out to me by Dr. ALLYN Holly at shift change at 6am .  See initial ED Provider note for details of the presentation, ED course, working diagnosis and plan of care.  Briefly by report patient presented with 8-hour history of left upper quadrant pain.  She has been sober since 4/4/21 from alcohol.  Patient is currently in treatment at [facility].  Plan of care at handoff is to reevaluate patient to review her pending lab results and additional imaging or diagnostic work-up as medically required with final disposition depending on ED course.        Significant Events after my assuming care: Patient was seen and examined at 6:50 AM.  Patient was asleep but awoke and reported that her pain is a 5 out of 10. Patient reported her symptoms began while watching a movie earlier in the evening Patient  reports she is originally from [facility].  She confirmed history of alcohol use disorder and has been sober as reported to the initial treating provider.  She reports a history of appendectomy at age 18.  No other abdominal surgeries.  She confirmed her prescribed medications including fluoxetine and naltrexone. No medication allergies.  Patient has  localized tenderness in the left upper quadrant with no rebound or guarding with hyperactive bowel sounds.  Abdomen was soft non-distended. We discussed and reviewed handoff and next steps for care.  After reviewing options for further evaluation and care patient agreed to proceed with imaging.  CT imaging with contrast was obtained to exclude intra-abdominal catastrophe or process that would contribute to her left upper quadrant pain with leukocytosis of 16.8.  She reported no abnormal vaginal discharge and no bleeding or spotting. Prior work-up by the initial treating provider was also reviewed.  Normal liver enzymes.  Glucose is 102.  Lipase is normal.  Negative  "troponin.    Patient's urinalysis is negative for infection or hematuria and urine pregnancy is negative.  CT imaging revealed acute diverticulitis in the distal segment of the transverse colon without associated abscess or free fluid.  See additional details in the radiology report.  Based on CT findings patient is discharged home with antibiotics with Augmentin twice daily x7 days after reviewing options for empiric treatment and to aid compliance with monotherapy  for acute diverticulitis.    After discussion with staff at [facility] patient is only to be discharged home with Toradol for pain management she was given 10 mg doses to use every 6-8 hours x10 tablets.    Exam:   Patient Vitals for the past 24 hrs:   BP Temp Temp src Pulse Resp SpO2 Height Weight   06/04/21 0800 104/68 -- -- 79 -- 97 % -- --   06/04/21 0630 120/74 -- -- 78 -- 96 % -- --   06/04/21 0600 112/65 -- -- 77 -- 98 % -- --   06/04/21 0530 121/85 -- -- 89 -- 99 % -- --   06/04/21 0502 107/76 99  F (37.2  C) Oral 78 16 98 % 1.676 m (5' 6\") 68 kg (150 lb)   06/04/21 0500 107/76 -- -- 80 -- 96 % -- --           ED RESULTS:   Results for orders placed or performed during the hospital encounter of 06/04/21 (from the past 24 hour(s))   Comprehensive metabolic panel     Status: Abnormal    Collection Time: 06/04/21  5:15 AM   Result Value Ref Range    Sodium 138 133 - 144 mmol/L    Potassium 3.9 3.4 - 5.3 mmol/L    Chloride 106 94 - 109 mmol/L    Carbon Dioxide 26 20 - 32 mmol/L    Anion Gap 6 3 - 14 mmol/L    Glucose 102 (H) 70 - 99 mg/dL    Urea Nitrogen 15 7 - 30 mg/dL    Creatinine 0.74 0.52 - 1.04 mg/dL    GFR Estimate >90 >60 mL/min/[1.73_m2]    GFR Estimate If Black >90 >60 mL/min/[1.73_m2]    Calcium 8.8 8.5 - 10.1 mg/dL    Bilirubin Total 0.6 0.2 - 1.3 mg/dL    Albumin 3.7 3.4 - 5.0 g/dL    Protein Total 7.1 6.8 - 8.8 g/dL    Alkaline Phosphatase 63 40 - 150 U/L    ALT 18 0 - 50 U/L    AST 10 0 - 45 U/L   Lipase     Status: None    " Collection Time: 06/04/21  5:15 AM   Result Value Ref Range    Lipase 144 73 - 393 U/L   CBC with platelets differential     Status: Abnormal    Collection Time: 06/04/21  5:15 AM   Result Value Ref Range    WBC 16.1 (H) 4.0 - 11.0 10e9/L    RBC Count 4.20 3.8 - 5.2 10e12/L    Hemoglobin 13.2 11.7 - 15.7 g/dL    Hematocrit 38.8 35.0 - 47.0 %    MCV 92 78 - 100 fl    MCH 31.4 26.5 - 33.0 pg    MCHC 34.0 31.5 - 36.5 g/dL    RDW 12.1 10.0 - 15.0 %    Platelet Count 338 150 - 450 10e9/L    Diff Method Automated Method     % Neutrophils 81.4 %    % Lymphocytes 8.2 %    % Monocytes 8.1 %    % Eosinophils 0.5 %    % Basophils 0.2 %    % Immature Granulocytes 1.6 %    Nucleated RBCs 0 0 /100    Absolute Neutrophil 13.1 (H) 1.6 - 8.3 10e9/L    Absolute Lymphocytes 1.3 0.8 - 5.3 10e9/L    Absolute Monocytes 1.3 0.0 - 1.3 10e9/L    Absolute Eosinophils 0.1 0.0 - 0.7 10e9/L    Absolute Basophils 0.0 0.0 - 0.2 10e9/L    Abs Immature Granulocytes 0.3 0 - 0.4 10e9/L    Absolute Nucleated RBC 0.0    Troponin I     Status: None    Collection Time: 06/04/21  5:15 AM   Result Value Ref Range    Troponin I ES <0.015 0.000 - 0.045 ug/L   UA reflex to Microscopic     Status: None    Collection Time: 06/04/21  7:09 AM   Result Value Ref Range    Color Urine Yellow     Appearance Urine Clear     Glucose Urine Negative NEG^Negative mg/dL    Bilirubin Urine Negative NEG^Negative    Ketones Urine Negative NEG^Negative mg/dL    Specific Gravity Urine 1.013 1.003 - 1.035    Blood Urine Negative NEG^Negative    pH Urine 6.0 5.0 - 7.0 pH    Protein Albumin Urine Negative NEG^Negative mg/dL    Urobilinogen mg/dL 0.0 0.0 - 2.0 mg/dL    Nitrite Urine Negative NEG^Negative    Leukocyte Esterase Urine Negative NEG^Negative    Source Midstream Urine    HCG qualitative urine     Status: None    Collection Time: 06/04/21  7:09 AM   Result Value Ref Range    HCG Qual Urine Negative NEG^Negative   CT Abdomen Pelvis w Contrast     Status: None    Collection  Time: 06/04/21  7:52 AM    Narrative    CT ABDOMEN PELVIS W CONTRAST 6/4/2021 7:52 AM    CLINICAL HISTORY: LUQ abdominal pain; Left upper quadrant pain and  discomfort.  History of alcohol abuse syndrome currently in treatment  sober since April 4, 2021.  Evaluate for acute intra-abdominal process  or catastrophe    TECHNIQUE: CT scan of the abdomen and pelvis was performed following  injection of IV contrast. Multiplanar reformats were obtained. Dose  reduction techniques were used.  CONTRAST: 74 mL Isovue 370    COMPARISON: None.    FINDINGS:   LOWER CHEST: Normal.    HEPATOBILIARY: Normal.    PANCREAS: Normal.    SPLEEN: Normal.    ADRENAL GLANDS: Normal.    KIDNEYS/BLADDER: Normal.    BOWEL: Normal caliber small bowel. Nonvisualized appendix. Colonic  diverticulosis. Acute inflammatory changes of the distal transverse  colon. Associated colonic wall thickening and surrounding edema. No  associated fluid collection or free air.    PELVIC ORGANS: Normal.    ADDITIONAL FINDINGS: None.    MUSCULOSKELETAL: Normal.      Impression    IMPRESSION:   1.  Acute uncomplicated diverticulitis involving the distal segment of  the transverse colon. No associated abscess or free air.    BISI MCGARRY MD       ED MEDICATIONS:   Medications   0.9% sodium chloride BOLUS (0 mLs Intravenous Stopped 6/4/21 0721)     Followed by   sodium chloride 0.9% infusion (0 mLs Intravenous Hold 6/4/21 0533)   amoxicillin-clavulanate (AUGMENTIN) 875-125 MG per tablet 1 tablet (has no administration in time range)   ketorolac (TORADOL) injection 10 mg (has no administration in time range)   lidocaine (XYLOCAINE) 2 % 15 mL, alum & mag hydroxide-simethicone (MAALOX) 15 mL GI Cocktail (30 mLs Oral Given 6/4/21 0531)   famotidine (PEPCID) infusion 40 mg (0 mg Intravenous Stopped 6/4/21 0721)   iopamidol (ISOVUE-370) solution 74 mL (74 mLs Intravenous Given 6/4/21 0738)   sodium chloride 0.9 % bag 500mL for CT scan flush use (58 mLs Intravenous  Given 6/4/21 0739)   acetaminophen (TYLENOL) tablet 1,000 mg (1,000 mg Oral Given 6/4/21 5252)         Impression:    ICD-10-CM    1. LUQ abdominal pain  R10.12 UA reflex to Microscopic     HCG qualitative urine    Due to acute diverticulitis involving the distal segment of the transverse colon   2. History of alcohol use disorder  Z87.898     currently sober since April 4, 2021. In treatment at [facility]   3. Acute diverticulitis  K57.92     Involving the distal segment of the transverse colon.       Plan:    Discharge back to [facility] for ongoing care for alcohol abuse syndrome.  Plan to take Augmentin 875 twice a day x7 days for acute diverticulitis involving the distal segment of the transverse colon.  Reasons to return for reevaluation reviewed with the patient who expressed understanding, comfort and agreement with plan of care.  Toradol for additional pain management if required per discussion with staff at [facility].      MD Jyothi Waldrop Ebenezer Tope, MD  06/04/21 0576

## 2021-06-04 NOTE — DISCHARGE INSTRUCTIONS
Diverticulitis    Some people get pouches along the wall of the colon as they get older. These pouches are called diverticuli. They often cause no symptoms. If the pouches become blocked, you can get an infection. This infection is called diverticulitis. It causes pain in your lower belly (abdomen) and fever. It may also cause nausea, vomiting, diarrhea, or constipation. If not treated, it can become a serious health problem. It can cause an abscess to form inside the pouch. The abscess may block the intestinal tract. It may even rupture, spreading infection throughout the belly.   When treatment is started early, oral antibiotics alone may be enough to cure diverticulitis. This method is tried first. But if you don't improve or if your condition gets worse while using these medicines, you may need to be admitted to the hospital. There, you will get antibiotics through an IV. You may also have to rest your bowel. That means you won't eat or drink for a period of time. Severe cases may need surgery.   Home care  These guidelines will help you care for yourself at home:     During the acute illness, rest and follow your healthcare provider's instructions about diet. Sometimes you will need to be on a clear liquid diet to rest your bowel. Once your symptoms are better, you may be told to eat a low-fiber diet for some time. You can eat foods such as:  ? Flake cereal  ? Mashed potatoes  ? Pancakes and waffles  ? Pasta  ? White bread  ? Rice  ? Applesauce  ? Bananas  ? Eggs  ? Fish  ? Poultry  ? Tofu  ? Cooked soft vegetables    Take antibiotics exactly as told. Don't miss any doses or stop taking the medicine, even if you feel better.    Monitor your temperature. Tell your healthcare provider if you have a rising temperature.  Preventing future attacks  Once you have an episode of diverticulitis, you are at risk for having it again. But you may be able to lower your risk by eating a high-fiber diet (20 g/day to 35  g/day of fiber). This cleans out the colon pouches that already exist. It may also prevent new ones from forming. Foods high in fiber include:     Fresh fruits and edible peelings    Raw or lightly cooked vegetables    Whole-grain cereals and breads    Dried beans and peas    Bran  Here are other steps you can take to help prevent future attacks:     Take your medicines, such as antibiotics, as your healthcare provider says.    Drink 6 to 8 glasses of water every day, unless told otherwise.    Use a heating pad or hot water bottle to help belly cramping or pain.    Start an exercise program. Ask your healthcare provider how to get started. You can benefit from simple activities such as walking or gardening.    Treat diarrhea with a bland diet. Start with liquids only. Then slowly add fiber over time.    Watch for changes in your bowel movements (constipation to diarrhea). Prevent constipation by eating a high-fiber diet and taking a stool softener if needed.    Get plenty of rest and sleep.  Follow-up care  Check in with your healthcare provider as advised or sooner if you are not getting better in the next 2 days.   When to call your healthcare provider   Call your healthcare provider right away if any of these occur:    Fever of 100.4 F (38 C) or higher, or as directed by your healthcare provider    Repeated vomiting or swelling of the belly    Weakness, dizziness, light-headedness    Pain in your belly that gets worse, is severe, or spreads to your back    Pain that moves to the right lower belly    Rectal bleeding (stools that are red, black, or maroon in color)    Unexpected vaginal bleeding  [employer] last reviewed this educational content on 8/1/2019 2000-2021 [employer]. All rights reserved. This information is not intended as a substitute for professional medical care. Always follow your healthcare professional's instructions.      1) Your evaluation today revealed that your pain in the  left upper quadrant is due to acute diverticulitis involving the distal segment of the transverse colon on imaging.    2) You are discharged home with oral antibiotics (Augmentin) to take 1 tablet twice a day for the next 1 week.    3) For pain management be sure to consider using over-the-counter pain medication including Tylenol 650 mg every 4 hours if needed or Motrin ibuprofen 400 mg every 6 hours if needed. For additional pain control you are given Toradol to use for pain management

## 2021-06-06 NOTE — PROGRESS NOTES
"Assessment/Plan:    Lower abdominal pain  New complaint with relatively benign exam.  Some distention on exam.  No evidence of acute abdomen.  The pattern and time course does not fit with ovarian rupture.  Patient is low risk for PID.  Status post appendectomy.  No connection to meals both in location of discomfort and in pattern of discomfort.  No relief with bowel movements.  Patient is currently undergoing treatment for infertility and took Clomid for the first time earlier this month.  I suspect that this is a side effect of this therapy.  Acute onset suggest that inflammatory bowel disease is unlikely.  Symptoms not consistent with viral gastroenteritis.  No recent travel/exposures for bacterial enteritis.  Her exam was reassuring and I suggested that for now, she focus on healthy nutrition and bowel hygiene.  If her symptoms continue, consider additional work-up and consider CT scan.     Return in about 1 week (around 3/9/2020) for recheck if not improving.    Mk Valenzuela MD  _______________________________    Chief Complaint   Patient presents with     Bloated     x 5 days, pt states \" I feel like I need to have a BM\"      Subjective: Leila Muir is a 39 y.o. year old female who I have seen in clinic before who presents with the following acute complaint(s):    Abdominal pain:   - suprapubic cramps. \"Gassy and bloated.\"  She feels full like she needs to have a BM despite having BMs.  BM Victoria type IV today.  No blood in stool.  LMP 2/10.  Home urine pregnacnt test was negative.  Similar symptoms have been shorter duraiton in the past.  Stool softener helps.  She has to urinate 3x/night.  No dysuria.  No fevers.  No chills. She is on clomid and esterol.  Pelvic US showed normal ovaries on 2/14.      ROS: Complete review of systems obtained.  Pertinent items are listed above.     The following portions of the patient's history were reviewed and updated as appropriate: allergies, current " medications, past family history, past medical history, past surgical history and problem list.     Objective:   /60 (Patient Site: Left Arm, Patient Position: Sitting, Cuff Size: Adult Regular)   Pulse 76   Wt 158 lb (71.7 kg)   LMP 02/10/2020   Breastfeeding No   BMI 25.50 kg/m    General: No acute distress  Eyes: No conjunctival injection, no scleral icterus.  Cardiac: Regular rate and rhythm, normal S1/S2, no murmurs of the gallops  Respiratory: Clear to auscultation bilaterally  Abdomen: Soft, nondistended. No hepatosplenomegaly.  Mild suprapubic tenderness.  No rebound tenderness.  No guarding.  No CVA tenderness bilaterally.  Skin: No jaundice.  Psych: Normal affect.    No results found for this or any previous visit (from the past 24 hour(s)).  No results found.    Additional History from Old Records Summarized (2): no  Decision to Obtain Records (1): no  Radiology Tests Summarized or Ordered (1): no  Labs Reviewed or Ordered (1): no  Medicine Test Summarized or Ordered (1): no  Independent Review of EKG or X-RAY(2 each): no    This note has been dictated using voice recognition software. Any grammatical or context distortions are unintentional and inherent to the software

## 2021-06-06 NOTE — TELEPHONE ENCOUNTER
Symptom  Describe your symptoms: Light red rash around her neck, very itchy, slightly raised and bump.  Rash is so itchy, it woke her from her sleep last night and couldn't fall back to sleep.  Any pain: no  New/Ongoing: New  How long have you been having symptoms: Three  day(s)  Have you been seen for this:  No  Appointment offered?: Patient declines  Triage offered?: No, [place]  Home remedies tried: none  Requested Pharmacy: [facility] in [place], [place]  Okay to leave a detailed message? Yes     She had worn a new sweater on 3/8/20 but it had been washed.  Sweater is made out of cotton and jacinto.     Patient if Dr. Cutler would order a topical cream to relieve the itch.

## 2021-06-08 NOTE — PROGRESS NOTES
Pt NS'ed for second visit in a row.  Called pt's phone and voicemail box full so unable to leave a message.  NS charge performed per NS policy that pt signed at initial evaluation.  Tori Freeman PT, DPT, OCS

## 2021-06-09 NOTE — TELEPHONE ENCOUNTER
Refill Approved    Rx renewed per Medication Renewal Policy. Medication was last renewed on 4/11/19.    Nadege Shah, [facility] Triage/Med Refill 6/22/2020     Requested Prescriptions   Pending Prescriptions Disp Refills     FLUoxetine (PROZAC) 40 MG capsule [Pharmacy Med Name: FLUOXETINE HCL 40 MG CAPSULE] 180 capsule 3     Sig: TAKE 2 CAPSULES (80 MG TOTAL) BY MOUTH DAILY. CLINIC VISIT REQUIRED BEFORE ADDITIONAL REFILLS.       SS[facility] Refill Protocol  Passed - 6/22/2020 12:16 AM        Passed - PCP or prescribing provider visit in last year     Last office visit with prescriber/PCP: 3/2/2020 Mk Valenzuela MD OR same dept: 3/2/2020 Mk Valenzuela MD OR same specialty: 3/2/2020 Mk Valenzuela MD  Last physical: Visit date not found Last MTM visit: Visit date not found   Next visit within 3 mo: Visit date not found  Next physical within 3 mo: Visit date not found  Prescriber OR PCP: Mk Valenzuela MD  Last diagnosis associated with med order: 1. Moderate episode of recurrent major depressive disorder (H)  - FLUoxetine (PROZAC) 40 MG capsule [Pharmacy Med Name: FLUOXETINE HCL 40 MG CAPSULE]; Take 2 capsules (80 mg total) by mouth daily. Clinic visit required before additional refills.  Dispense: 180 capsule; Refill: 3    If protocol passes may refill for 12 months if within 3 months of last provider visit (or a total of 15 months).

## 2021-06-10 NOTE — TELEPHONE ENCOUNTER
Patient calls today with nausea and vomiting x12 hours. She states she did not feel well yesterday afternoon and had mild headache. Last evening she began vomiting. She has vomited 5-6 times overnight. Denies fevers. Denies bloody or black vomit. Denies recent head or abdominal trauma or injury. Denies severe abdominal pains. The patient has stomach pain prior to vomiting which feels better after vomiting. She has been drinking liquids but states she throws up most of the liquids she drinks. Denies dizziness or dry mouth. She last urinated this morning with yellow urine.     I recommended that patient continue treating this at home today. Most acute vomiting lasts anywhere from 12 hours to 2 days. It is expected that vomiting should improve. Patient should make it a priority to continue with fluids to prevent dehydration. Can do small sips of clear liquids such as water or sports drinks. Patient should call back for worsening symptoms or signs of dehydration such as decreased urine output, dizziness/weakness, dry mouth. Should also call back if vomiting does not begin to improve within 1 day. Patient verbalizes understanding of this advice.    Debbi Murcia RN      Reason for Disposition    Vomiting    Additional Information    Negative: Drinking very little and has signs of dehydration (e.g., no urine > 12 hours, very dry mouth, very lightheaded)    Negative: Constant abdominal pain lasting > 2 hours    Negative: High-risk adult (e.g., brain tumor, V-P shunt, hernia)    Negative: Severe pain in one eye    Negative: Patient sounds very sick or weak to the triager    Negative: Vomiting and abdomen looks much more swollen than usual    Negative: Fever > 103 F (39.4 C)    Negative: Fever > 101 F (38.3 C) and over 60 years of age    Negative: Fever > 100.0 F (37.8 C) and has a weak immune system (e.g., HIV positive, cancer chemo, organ transplant, splenectomy, chronic steroids)    Negative: Fever > 100.0 F (37.8 C)  and bedridden (e.g., nursing home patient, stroke, chronic illness, recovering from surgery)    Negative: Taking any of the following medications: digoxin (Lanoxin), lithium, theophylline, phenytoin (Dilantin)    Negative: SEVERE headache and vomiting    Negative: SEVERE vomiting (e.g., 6 or more times/day)    Negative: MODERATE vomiting (e.g., 3 - 5 times/day) and age > 60    Negative: Vomiting contains bile (green color)    Negative: Vomiting red blood or black (coffee ground) material    Negative: Insulin-dependent diabetes and glucose > 240 mg/dL (13 mmol/L)    Negative: Recent head injury (within 3 days)    Negative: Recent abdominal injury (within 7 days)    Negative: MILD to MODERATE vomiting (e.g., 1-5 times/day) and lasts > 48 hours (2 days)    Negative: Fever present > 3 days (72 hours)    Negative: Patient wants to be seen    Negative: Vomiting a prescription medication    Negative: Alcohol abuse, known or suspected    Negative: Vomiting is a chronic symptom (recurrent or ongoing AND lasting > 4 weeks)    Protocols used: VOMITING-A-OH

## 2021-06-14 NOTE — TELEPHONE ENCOUNTER
"Patient states she was exposed to Covid 19 as she went to [place] to care for in-laws that tested positive.  Today she developed symptoms of being tired and \"my chest kind of hurts\".  Asking to be tested for Covid 19 as her 7 year old is to return to school.  As FNA was providing Care Advice as far as testing and quarantine, patient vocalized her frustration as she is unable to access her MyChart.  FNA offered to provider patient with contact number for MyChart support and patient continued to voice frustration as she has contacted them and they say they will send code and never do.  Patient then thanked FNA and disconnected call.    Idania Culver RN  [facility] Triage Nurse Advisor    Reason for Disposition    [1] COVID-19 infection suspected by caller or triager AND [2] mild symptoms (cough, fever, or others) AND [3] no complications or SOB    Additional Information    Negative: SEVERE difficulty breathing (e.g., struggling for each breath, speaks in single words)    Negative: Difficult to awaken or acting confused (e.g., disoriented, slurred speech)    Negative: Bluish (or gray) lips or face now    Negative: Shock suspected (e.g., cold/pale/clammy skin, too weak to stand, low BP, rapid pulse)    Negative: Sounds like a life-threatening emergency to the triager    Negative: [1] COVID-19 exposure AND [2] no symptoms    Negative: [1] Lives with someone known to have influenza (flu test positive) AND [2] flu-like symptoms (e.g., cough, runny nose, sore throat, SOB; with or without fever)    Negative: [1] Adult with possible COVID-19 symptoms AND [2] triager concerned about severity of symptoms or other causes    Negative: COVID-19 vaccine reaction suspected (e.g., fever, headache, muscle aches) occurring during days 1-3 after getting vaccine    Negative: COVID-19 vaccine, questions about    Negative: COVID-19 and breastfeeding, questions about    Negative: SEVERE or constant chest pain or pressure " (Exception: mild central chest pain, present only when coughing)    Negative: MODERATE difficulty breathing (e.g., speaks in phrases, SOB even at rest, pulse 100-120)    Negative: [1] Headache AND [2] stiff neck (can't touch chin to chest)    Negative: MILD difficulty breathing (e.g., minimal/no SOB at rest, SOB with walking, pulse <100)    Negative: Chest pain or pressure    Negative: Patient sounds very sick or weak to the triager    Negative: Fever > 103 F (39.4 C)    Negative: [1] Fever > 101 F (38.3 C) AND [2] age > 60    Negative: [1] Fever > 100.0 F (37.8 C) AND [2] bedridden (e.g., nursing home patient, CVA, chronic illness, recovering from surgery)    Negative: [1] HIGH RISK patient (e.g., age > 64 years, diabetes, heart or lung disease, weak immune system) AND [2] new or worsening symptoms    Negative: [1] HIGH RISK patient AND [2] influenza is widespread in the community AND [3] ONE OR MORE respiratory symptoms: cough, sore throat, runny or stuffy nose    Negative: [1] HIGH RISK patient AND [2] influenza exposure within the last 7 days AND [3] ONE OR MORE respiratory symptoms: cough, sore throat, runny or stuffy nose    Negative: Fever present > 3 days (72 hours)    Negative: [1] Fever returns after gone for over 24 hours AND [2] symptoms worse or not improved    Negative: [1] Continuous (nonstop) coughing interferes with work or school AND [2] no improvement using cough treatment per protocol    Protocols used: CORONAVIRUS (COVID-19) DIAGNOSED OR ARDRSZGOW-B-OR 1.3.21

## 2021-06-14 NOTE — TELEPHONE ENCOUNTER
"Coronavirus (COVID-19) Notification    Caller Name (Patient, parent, daughter/son, grandparent, etc)  Leila Moraleselson    Reason for call  Notify of Positive Coronavirus (COVID-19) lab results, assess symptoms,  review  [facility] recommendations    Lab Result    Lab test:  2019-nCoV rRt-PCR or SARS-CoV-2 PCR    Oropharyngeal AND/OR nasopharyngeal swabs is POSITIVE for 2019-nCoV RNA/SARS-COV-2 PCR (COVID-19 virus)    RN Recommendations/Instructions per [facility] Coronavirus COVID-19 recommendations    Brief introduction script  Introduce self and then review script:  \"I am calling on behalf of [employer].  We were notified that your Coronavirus test (COVID-19) for was POSITIVE for the virus.  I have some information to relay to you but first I wanted to mention that the [place] Dept of Health will be contacting you shortly [it's possible MD already called Patient] to talk to you more about how you are feeling and other people you have had contact with who might now also have the virus.  Also,  [facility] is Partnering with the [facility] for Covid-19 research, you may be contacted directly by research staff.\"    Assessment (Inquire about Patient's current symptoms)   Assessment   Current Symptoms at time of phone call: (if no symptoms, document No symptoms] Fatigue and congestion   Symptom onset (if applicable) 1/25/21     If at time of call, Patients symptoms hare worsened, the Patient should contact 911 or have someone drive them to Emergency Dept promptly:      If Patient calling 911, inform 911 personal that you have tested positive for the Coronavirus (COVID-19).  Place mask on and await 911 to arrive.    If Emergency Dept, If possible, please have another adult drive you to the Emergency Dept but you need to wear mask when in contact with other people.        Monoclonal Antibody Administration    You may be eligible to receive a new treatment with a monoclonal antibody for " "preventing hospitalization in patients at high risk for complications from COVID-19.   This medication is still experimental and available on a limited basis; it is given through an IV and must be given at an infusion center. Please note that not all people who are eligible will receive the medication since it is in limited supply.     Are you interested in being considered for this medication?  No.  Does the patient fit the criteria: Patient declined    If patient qualifies based on above criteria:  \"We will contact you if you are selected to receive the medication in the next 1-2 days.   This is time sensitive and if you are not selected in the next 1-2 days, you will not receive the medication.  If you do not receive a call to schedule, you have not been selected.\"    Review information with Patient    Your result was positive. This means you have COVID-19 (coronavirus).  We have sent you a letter that reviews the information that I'll be reviewing with you now.    How can I protect others?    If you have symptoms: stay home and away from others (self-isolate) until:    You've had no fever--and no medicine that reduces fever--for 1 full day (24 hours). And      Your other symptoms have gotten better. For example, your cough or breathing has improved. And     At least 10 days have passed since your symptoms started. (If you ve been told by a doctor that you have a weak immune system, wait 20 days.)     If you don't have symptoms: Stay home and away from others (self-isolate) until at least 10 days have passed since your first positive COVID-19 test. (Date test collected).    During this time:    Stay in your own room, including for meals. Use your own bathroom if you can.    Stay away from others in your home. No hugging, kissing or shaking hands. No visitors.     Don't go to work, school or anywhere else.     Clean  high touch  surfaces often (doorknobs, counters, handles, etc.). Use a household cleaning spray or " wipes. You'll find a full list on the EPA website at www.epa.gov/pesticide-registration/list-n-disinfectants-use-against-sars-cov-2.     Cover your mouth and nose with a mask, tissue or other face covering to avoid spreading germs.    Wash your hands and face often with soap and water.    Caregivers in these groups are at risk for severe illness due to COVID-19:  o People 65 years and older  o People who live in a nursing home or long-term care facility  o People with chronic disease (lung, heart, cancer, diabetes, kidney, liver, immunologic)  o People who have a weakened immune system, including those who:  - Are in cancer treatment  - Take medicine that weakens the immune system, such as corticosteroids  - Had a bone marrow or organ transplant  - Have an immune deficiency  - Have poorly controlled HIV or AIDS  - Are obese (body mass index of 40 or higher)  - Smoke regularly    Caregivers should wear gloves while washing dishes, handling laundry and cleaning bedrooms and bathrooms.    Wash and dry laundry with special caution. Don't shake dirty laundry, and use the warmest water setting you can.    If you have a weakened immune system, ask your doctor about other actions you should take.    For more tips, go to www.cdc.gov/coronavirus/2019-ncov/downloads/10Things.pdf.    You should not go back to work until you meet the guidelines above for ending your home isolation. You don't need to be retested for COVID-19 before going back to work--studies show that you won't spread the virus if it's been at least 10 days since your symptoms started (or 20 days, if you have a weak immune system).    Employers: This document serves as formal notice of your employee's medical guidelines for going back to work. They must meet the above guidelines before going back to work in person.    How can I take care of myself?    1. Get lots of rest. Drink extra fluids (unless a doctor has told you not to).    2. Take Tylenol  (acetaminophen) for fever or pain. If you have liver or kidney problems, ask your family doctor if it's okay to take Tylenol.     Take either:     650 mg (two 325 mg pills) every 4 to 6 hours, or     1,000 mg (two 500 mg pills) every 8 hours as needed.     Note: Don't take more than 3,000 mg in one day. Acetaminophen is found in many medicines (both prescribed and over-the-counter medicines). Read all labels to be sure you don't take too much.    For children, check the Tylenol bottle for the right dose (based on their age or weight).    3. If you have other health problems (like cancer, heart failure, an organ transplant or severe kidney disease): Call your specialty clinic if you don't feel better in the next 2 days.    4. Know when to call 911: Emergency warning signs include:    Trouble breathing or shortness of breath    Pain or pressure in the chest that doesn't go away    Feeling confused like you haven't felt before, or not being able to wake up    Bluish-colored lips or face    5. Sign up for [employer]. We know it's scary to hear that you have COVID-19. We want to track your symptoms to make sure you're okay over the next 2 weeks. Please look for an email from [employer]--this is a free, online program that we'll use to keep in touch. To sign up, follow the link in the email. Learn more at [email or web address].    Where can I get more information?    [facility] [employer]: [email or web address]    Coronavirus Basics: www.health.[facility].[place]/diseases/coronavirus/basics.html    What to Do If You're Sick: www.cdc.gov/coronavirus/2019-ncov/about/steps-when-sick.html    Ending Home Isolation: www.cdc.gov/coronavirus/2019-ncov/hcp/disposition-in-home-patients.html     Caring for Someone with COVID-19: www.cdc.gov/coronavirus/2019-ncov/if-you-are-sick/care-for-someone.html     [facility] clinical trials (COVID-19 research studies): clinicalaffairs.[facility]-clinical-trials     A  Positive COVID-19 letter will be sent via [employer] or the Mail.  (Exception, no letters sent to Presurgerical/Preprocedure Patients)    Graham Alaniz LPN

## 2021-06-14 NOTE — PROGRESS NOTES
Leila Muir is a 40 y.o. female who is being evaluated via a billable video visit.      How would you like to obtain your AVS? MyChart.  If dropped from the video visit, the video invitation should be resent by: Text to cell phone: [phone] (H)   Will anyone else be joining your video visit? No    Video Start Time: 11:27 AM    Assessment/Plan:    Leila was seen today for generalized body aches and fatigue.    Diagnoses and all orders for this visit:    Suspected 2019 novel coronavirus infection: Positive contact.  Symptoms fit with COVID-19.  Testing is indicated.  Discussed self-isolation and quarantine for other family members.  If she does test positive, I will order COVID-19 testing for her family (Ld and [place]) to help with quarantining planning.  -     Symptomatic COVID-19 Virus (CORONAVIRUS) PCR; Future     Mk Valenzuela MD  _______________________________    Chief Complaint   Patient presents with     Generalized Body Aches     x 3 days ( pt exposed to covid January 14th)      Fatigue     Subjective: Leila Muir is a 40 y.o. year old female who I have seen in clinic before who presents with the following acute complaint(s):    COVID concern:   - narrative history / onset: mild symptoms of fatigue   - Exposure: Yes - father in law was positive in mid January.  He was quarantined.  They shared air with father in law.    - fever? Yes, I felt feverish or had chills   - difficulty breathing? No   - cough? Off and on.    - history of lung disease (asthma, bronchitis, or lung disease)? NO   - daughter back in school.    - Are there people you know with similar symptoms?    - What other symptoms have you experienced? Body aches   - palliative: time, water, dayquil, nyquil.  Some NSAIDs. She feels drowsy.    ROS: Complete review of systems obtained.  Pertinent items are listed above.     The following portions of the patient's history were reviewed and updated as appropriate: allergies,  current medications, past medical history and problem list.     Objective:   There were no vitals taken for this visit.  Video visit.   General: No acute distress  Skin: No rashes or lesions noted on the face and hands.  Anicteric.  Respiratory: Non-labored breathing.   Psych: Normal affect.  Normal rate of speech.  No tangentiality.  Thinking is logical.  Neurologic: Cranial nerves II through XII grossly intact.      No results found for this or any previous visit (from the past 24 hour(s)).  No results found.    Additional History from Old Records Summarized (2): no  Decision to Obtain Records (1): no  Radiology Tests Summarized or Ordered (1): no  Labs Reviewed or Ordered (1): yes  Medicine Test Summarized or Ordered (1): no  Independent Review of EKG or X-RAY(2 each): no    This note has been dictated using voice recognition software. Any grammatical or context distortions are unintentional and inherent to the software    Video-Visit Details    Type of service:  Video Visit    Video End Time (time video stopped): 11:38AM    Originating Location (pt. Location): Home    Distant Location (provider location):  [facility]     Platform used for Video Visit: [employer]

## 2021-06-14 NOTE — PROGRESS NOTES
Leila Muir is a 40 y.o. female who is being evaluated via a billable video visit.      How would you like to obtain your AVS? MyChart.  If dropped from the video visit, the video invitation should be resent by: Text to cell phone: [phone] (H)   Will anyone else be joining your video visit? No    Video Start Time: 7:23 AM    Assessment/Plan:    Alcohol dependence with uncomplicated withdrawal (H)  This patient is in withdrawal from alcohol.  She had been drinking at least 7 drinks of vodka every other day for the past couple of months.  Most recent consumption approximately 48 hours.  She also is COVID-19 at this time.  She is planning on starting some form of outpatient chemical dependency therapy early next week.  She is looking for some assistance with cravings and asked specifically about naltrexone.  She also feels as though she has very little motivation.  We discussed that this is likely multifactorial.  He has been taking Prozac and unclear if this is been helpful.  She understands that she is at a an extremely high dose of fluoxetine at this time.  -No referrals were placed for chemical dependency in part because the patient already has her own resources.  She needs additional assistance, close referral for chemical dependency counseling based on phone call or email.  -Discussed augmentation of her current mental health plan.  We will add Wellbutrin which hopefully will help with some cravings.  -We will also add naltrexone.  -2-week follow-up recommended  -Sleeplessness.  Likely associated with acute withdrawal.  Previously she is done well on trazodone.  We did discuss briefly that she may benefit from benzodiazepine class medications.  We will withhold these medicines for now based on patient's preference.  -We also discussed Antabuse and Campral as medicines that might help with maintenance.  We will revisit at a future visit.    Return in about 2 weeks (around 2/12/2021) for Recheck  "follow-up visit, (Video Visit).    Mk Valenzuela MD  _______________________________    Chief Complaint   Patient presents with     Medication Education Visit     pt would would like to discuss medication for alcohol abuse      Subjective: Leila Muir is a 40 y.o. year old female who returns to clinic for the following chronic complaints/concerns:     Ethanol consumption?:   - she has a history of alcoholism.  She has been on naltrexone in the past.     - she is not currently drinking.  2 days    - she is not sure if the prozac has been working,  She has very little energy.  No SI.  Anxiety around \"not getting things done.\"  Overwhelming.  She has plans to start outpatient treatment on Monday.  She was through treatment in August.  7 drinks a day every other day.  Vodka. She does not have a history of seizures.  No symptoms of panic.      Review of systems is negative except for as shown in the HPI.    The following portions of the patient's history were reviewed and updated as appropriate: allergies, current medications, past family history, past medical history, past social history and problem list.    Objective:    vitals were not taken for this visit.   Physical Exam  Constitutional:       General: She is not in acute distress.     Appearance: Normal appearance.   HENT:      Head: Normocephalic and atraumatic.   Eyes:      General: No scleral icterus.     Conjunctiva/sclera: Conjunctivae normal.   Pulmonary:      Effort: Pulmonary effort is normal.   Skin:     Findings: No rash.   Neurological:      General: No focal deficit present.      Mental Status: She is alert and oriented to person, place, and time.   Psychiatric:         Behavior: Behavior normal.      Comments: Appears anxious.  Responses are brief.       PHQ-9 Total Score: 1 (1/27/2021 11:17 AM)    PURA 7 Total Score: 0 (1/27/2021 11:00 AM)    No data recorded  No data recorded    No results found for this or any previous visit (from the " past 24 hour(s)).    This note has been dictated using voice recognition software. Any grammatical or context distortions are unintentional and inherent to the software    Video-Visit Details    Type of service:  Video Visit    Video End Time (time video stopped): 7:47 AM  Originating Location (pt. Location): Home    Distant Location (provider location):  [facility]     Platform [facility] for Video Visit: [facility]

## 2021-06-15 NOTE — TELEPHONE ENCOUNTER
Refill Approved    Rx renewed per Medication Renewal Policy. Medication was last renewed on 1/29/21, last OV 2/12/21.    Jailene Franks, [facility] Triage/Med Refill 2/20/2021     Requested Prescriptions   Pending Prescriptions Disp Refills     traZODone (DESYREL) 50 MG tablet [Pharmacy Med Name: TRAZODONE 50 MG TABLET] 60 tablet 1     Sig: TAKE 1-2 TABLETS BY MOUTH AT BEDTIME.       Tricyclics/Misc Antidepressant/Antianxiety Meds Refill Protocol Passed - 2/20/2021  9:30 AM        Passed - PCP or prescribing provider visit in last year     Last office visit with prescriber/PCP: 3/2/2020 Mk Valenzuela MD OR same dept: 3/2/2020 Mk Valenzuela MD OR same specialty: 3/2/2020 Mk Valenzuela MD  Last physical: Visit date not found Last MTM visit: Visit date not found   Next visit within 3 mo: Visit date not found  Next physical within 3 mo: Visit date not found  Prescriber OR PCP: Mk Valenzuela MD  Last diagnosis associated with med order: 1. Alcohol dependence with uncomplicated withdrawal (H)  - traZODone (DESYREL) 50 MG tablet [Pharmacy Med Name: TRAZODONE 50 MG TABLET]; TAKE 1-2 TABLETS BY MOUTH AT BEDTIME.  Dispense: 60 tablet; Refill: 1    2. Moderate episode of recurrent major depressive disorder (H)  - traZODone (DESYREL) 50 MG tablet [Pharmacy Med Name: TRAZODONE 50 MG TABLET]; TAKE 1-2 TABLETS BY MOUTH AT BEDTIME.  Dispense: 60 tablet; Refill: 1    3. Psychophysiological insomnia  - traZODone (DESYREL) 50 MG tablet [Pharmacy Med Name: TRAZODONE 50 MG TABLET]; TAKE 1-2 TABLETS BY MOUTH AT BEDTIME.  Dispense: 60 tablet; Refill: 1    If protocol passes may refill for 12 months if within 3 months of last provider visit (or a total of 15 months).

## 2021-06-15 NOTE — TELEPHONE ENCOUNTER
Received Fax request from pharmacy for 90 day vs 30 day supply of med.    Order pended should you approve.    Requested Prescriptions     Pending Prescriptions Disp Refills     buPROPion (WELLBUTRIN SR) 100 MG 12 hr tablet 180 tablet 0     Sig: Take 1 tablet (100 mg total) by mouth 2 (two) times a day.

## 2021-06-15 NOTE — PROGRESS NOTES
"Type of service:  Video Visit    Leila Muir is a 40 y.o. female who is being evaluated via a billable video visit.      How would you like to obtain your AVS? MyChart.  If dropped from the video visit, the video invitation should be resent by: Text to cell phone: [phone] (H)   Will anyone else be joining your video visit? No    Video Start Time: 4:35 PM  Video End Time (time video stopped): 4:52 PM  Originating Location (pt. Location): Home  Distant Location (provider location):  [facility]   Platform used for Video Visit: Well    Assessment/Plan:    Alcohol dependence with uncomplicated withdrawal (H)  This patient is doing well in comparison to when we spoke on 1/29.  She has had no alcohol since approximately 1/27 she is doing 12 hours of outpatient therapy per week (in the evenings through [facility]).  She has had one episode of nausea which might be attributed to naltrexone.  She thinks that Wellbutrin has helped improve clarity of thought.  We discussed the possibility that she might have some symptoms of excessive serotonergic activity as result of the high dose of fluoxetine.  -Trial fluoxetine at 60 mg.  20 mg capsules sent to pharmacy to help facilitate the slow downward dosing of fluoxetine.  -Continue Wellbutrin SR at 100 mg twice daily.  -Take naltrexone once daily.  She will try taking 50 mg and continue.  She believes that this has helped with craving.  -Decreased use of trazodone.  Use as needed.     Return in about 6 weeks (around 3/26/2021) for Depression, Anxiety.     Mk Valenzuela MD  _______________________________    Chief Complaint   Patient presents with     Follow-up     medications ( wellbutrin and naltrexone)      Subjective: Leila Muir is a 40 y.o. year old female who returns to clinic for the following chronic complaints/concerns:     alcoholism:   - \"I started treatment.\"  She says that she is feeling better.  She thinks that " "naltrexone helps with cravings. She has had some symptoms of nausea.  She wonders if this is still nausea from COVID.   - she is doing 3 hours per night Monday - Thursday.  She thinks that she is doing better because she has to continue to manage \"real life.\"  \"Learn tactics and apply at the same time.\"  She says that she has a great group.  Program through [employer].    - wellbutrin: \"I am far more motivated than I was before.\"  Less anxiety in the morning.  \"I think that it is helping.\"  She wonders about dose decrease   - sleep: \"way better.\"  She has not been using trazodone.     Review of systems is negative except for as shown in the HPI.    The following portions of the patient's history were reviewed and updated as appropriate: allergies, current medications, past medical history, past social history and problem list.    Objective:    vitals were not taken for this visit.   Physical Exam  Constitutional:       General: She is not in acute distress.     Appearance: Normal appearance.   HENT:      Head: Normocephalic and atraumatic.   Eyes:      General: No scleral icterus.     Conjunctiva/sclera: Conjunctivae normal.   Pulmonary:      Effort: Pulmonary effort is normal.   Skin:     Findings: No rash.   Neurological:      General: No focal deficit present.      Mental Status: She is alert and oriented to person, place, and time.   Psychiatric:         Mood and Affect: Mood normal.         Behavior: Behavior normal.       PHQ-9 Total Score: 2 (2/12/2021  4:00 PM)    PURA 7 Total Score: 0 (2/12/2021  4:00 PM)    No data recorded  No data recorded    No results found for this or any previous visit (from the past 24 hour(s)).    This note has been dictated using voice recognition software. Any grammatical or context distortions are unintentional and inherent to the software  "

## 2021-06-16 PROBLEM — B97.7 HUMAN PAPILLOMA VIRUS INFECTION: Status: ACTIVE | Noted: 2021-03-30

## 2021-06-16 PROBLEM — F51.04 PSYCHOPHYSIOLOGICAL INSOMNIA: Status: ACTIVE | Noted: 2019-01-23

## 2021-06-16 PROBLEM — Z87.42 HISTORY OF ABNORMAL CERVICAL PAPANICOLAOU SMEAR: Status: ACTIVE | Noted: 2021-03-30

## 2021-06-16 PROBLEM — F10.230 ALCOHOL DEPENDENCE WITH UNCOMPLICATED WITHDRAWAL (H): Status: ACTIVE | Noted: 2021-01-29

## 2021-06-16 NOTE — PROGRESS NOTES
"Type of service:  Video Visit    Leila Muir is a 40 y.o. female who is being evaluated via a billable video visit.      How would you like to obtain your AVS? MyChart.  If dropped from the video visit, the video invitation should be resent by: Text to cell phone: [phone]  Will anyone else be joining your video visit? No    Video Start Time: 9:00 AM  Video End Time (time video stopped): 9:10 AM  Originating Location (pt. Location): Home  Distant Location (provider location):  [facility]   Platform used for Video Visit: [employer]    Assessment/Plan:    Alcohol dependence with uncomplicated withdrawal (H)  She continues to work to maintain sobriety.  She describes having \"1 slip up\" but she is back at maintaining sobriety.  Continue treatment.  Continue naltrexone.  Unclear benefit of bupropion as relates to cravings.  Cravings have been higher recently as she goes further into her treatment plan.    Chronic Major Depression - generalized anxiety  Anxious symptoms have been worse recently with more cravings.  She thinks that she has more energy and attributes this to the decrease of the fluoxetine dose (was 80 mg and is currently 60 mg).  -Continue flecainide 60 mg.  -Discontinue Wellbutrin SR.  Start Wellbutrin  mg.  -Continue plan for maintenance of sobriety.  -Follow-up in 1 month?    No follow-ups on file.    Mk Valenzuela MD  _______________________________    Chief Complaint   Patient presents with     Follow-up     anxiety and depression ( please send a text to her phone)      Subjective: Leila Muir is a 40 y.o. year old female who returns to clinic for the following chronic complaints/concerns:     Anxiety and depression:   - she has been working out   - she has noticed that she has had some irritability.  Something is not jiving.  No symptoms of panic.  Sleep is stable.   -more energy.  She attributes to decreased zoloft dose.   - no alcohol for the most " part.  She says that she had one slip up but since then that she has been doing well.  She continues outpatient treatment.  Stress will be had this week as she has a parent working from home did not spring break.    Review of systems is negative except for as shown in the HPI.    The following portions of the patient's history were reviewed and updated as appropriate: allergies, current medications, past medical history and problem list.    Objective:    vitals were not taken for this visit.   Physical Exam  Constitutional:       General: She is not in acute distress.     Appearance: Normal appearance.   HENT:      Head: Normocephalic and atraumatic.   Eyes:      General: No scleral icterus.     Conjunctiva/sclera: Conjunctivae normal.   Pulmonary:      Effort: Pulmonary effort is normal.   Skin:     Findings: No rash.   Neurological:      General: No focal deficit present.      Mental Status: She is alert and oriented to person, place, and time.   Psychiatric:         Mood and Affect: Mood normal.         Behavior: Behavior normal.         Thought Content: Thought content normal.         Judgment: Judgment normal.         PHQ-9 Total Score: 10 (3/30/2021  8:00 AM)    PURA 7 Total Score: 12 (3/30/2021  8:00 AM)    No data recorded  No data recorded    No results found for this or any previous visit (from the past 24 hour(s)).    This note has been dictated using voice recognition software. Any grammatical or context distortions are unintentional and inherent to the software

## 2021-06-17 NOTE — PATIENT INSTRUCTIONS - HE
Patient Instructions by Mk Valenzuela MD at 1/23/2019  8:40 AM     Author: Mk Valenzuela MD Service: -- Author Type: Physician    Filed: 1/23/2019  9:07 AM Encounter Date: 1/23/2019 Status: Addendum    : Mk Valenzuela MD (Physician)    Related Notes: Original Note by Mk Valenzuela MD (Physician) filed at 1/23/2019  9:04 AM        - Google: sleep hygiene (American Sleep Association)   - Try Melatonin  (5 mg 1-2 hours before bedtime)    - CBT-I lesvia (cognitive behavioral therapy for insomnia)    Sleep Hygiene Tips - Research & Treatments  Getting good sleep is important in maintaining health. There are several things that you can do to promote good sleep and sleep hygiene, and ultimately Get Better Sleep.  What is sleep hygiene?  Sleep hygiene is defined as behaviors that one can do to help promote good sleep using behavioral interventions.  Sleep hygiene tips:  Maintain a regular sleep routine  Go to bed at the same time. Wake up at the same time. Ideally, your schedule will remain the same (+/- 20 minutes) every night of the week.  Avoid naps if possible  Naps decrease the Sleep Debt that is so necessary for easy sleep onset.   Each of us needs a certain amount of sleep per 24-hour period. We need that amount, and we dont need more than that.   When we take naps, it decreases the amount of sleep that we need the next night - which may cause sleep fragmentation and difficulty initiating sleep, and may lead to insomnia.  Dont stay in bed awake for more than 5-10 minutes.  If you find your mind racing, or worrying about not being able to sleep during the middle of the night, get out of bed, and sit in a chair in the dark. Do your mind racing in the chair until you are sleepy, then return to bed. No TV or internet during these periods! That will just stimulate you more than desired.  If this happens several times during the night, that is OK. Just maintain your regular wake time, and try to  avoid naps.  Dont watch TV or read in bed.  When you watch TV or read in bed, you associate the bed with wakefulness.   The bed is reserved for two things - sleep and hanky panky.  Drink caffeinated drinks with caution  The effects of caffeine may last for several hours after ingestion. Caffeine can fragment sleep, and cause difficulty initiating sleep. If you drink caffeine, use it only before noon.   Remember that soda and tea contain caffeine as well.     Avoid inappropriate substances that interfere with sleep  Cigarettes, alcohol, and over-the-counter medications may cause fragmented sleep.  Exercise regularly  Exercise before 2 pm every day. Exercise promotes continuous sleep.   Avoid rigorous exercise before bedtime. Rigorous exercise circulates endorphins into the body which may cause difficulty initiating sleep.   exercise and sleep  Have a quiet, comfortable bedroom  Set your bedroom thermostat at a comfortable temperature. Generally, a little cooler is better than a little warmer.   Turn off the TV and other extraneous noise that may disrupt sleep. Background white noise like a fan is OK.   If your pets awaken you, keep them outside the bedroom.   Your bedroom should be dark. Turn off bright lights.   Have a comfortable mattress.  If you are a clock watcher at night, hide the clock.    Have a comfortable pre-bedtime routine  A warm bath, shower   Meditation, or quiet time  Some who are struggling with sleep regularly find it helpful to print out these recommendations and read them regularly. If you accidentally miss some of recommendations, or have a bad night, do not fret. By following these sleep hygiene recommendations, you will help yourself to get into a routine that promotes good sleep opportunities.

## 2021-06-19 NOTE — PROGRESS NOTES
Assessment/Plan:    Leila was seen today for chest pain.    Diagnoses and all orders for this visit:    Chest tightness: This patient is describing chest tightness at rest.  She has had no discomfort with exertion.  EKG is supportive of non-cardiogenic etiology.  Vital signs, EKG the patient description of her symptoms does not suggest respiratory etiology.  There is no clear connection with food although she did have some discomfort on physical exam and the epigastrium.  Patient is low risk based on gender, family history, lifestyle for cardiogenic etiology however I would have a low threshold for placing referral to rapid access cardiology clinic or ordering a stress test given the patient's story thus far.  The patient and I discussed the utility of a d-dimer but given lack of symptoms suggestive of pulmonary embolism will hold off for now.  We did discuss close follow-up.  If her symptoms worsen over the course of the week we will order additional tests based on the symptoms.  For now, given the improvement that she had today with ibuprofen she will treat with NSAIDs.  Check basic lab work and review with patient tomorrow.  -     Electrocardiogram Perform and Read  -     Lipase  -     Comprehensive Metabolic Panel  -     HM2(CBC w/o Differential)     Return for recheck follow-up visit, if not improving.    Mk Valenzuela MD  _______________________________    Chief Complaint   Patient presents with     Chest Pain     on and off x 3 weeks      Subjective: Leila Muir is a 37 y.o. year old female who I have seen in clinic before who presents with the following acute complaint(s):    Chest pain:   - on and off for 3 weeks.   - worse today than any other time   - random sensation of fist clenching (in both and back)   - onset is when at rest. She noticed it today when laying down.   - exercise: less in the past month.   Pain has not been present during workout.   - Travel: drive to [place] lacks ~ one  month ago.  9 hour drive to [place] ~3 weeks ago.   - no shortness of breath.  No personal or family history of blood clots.  No swelling   - father with MI at age of 63.   - no palpable pain locations   - palliative: ibuprofen helped today.    ROS: Complete review of systems obtained.  Pertinent items are listed above.     The following portions of the patient's history were reviewed and updated as appropriate: allergies, current medications, past family history, past medical history and problem list.     Objective:   /64 (Patient Site: Left Arm, Patient Position: Sitting, Cuff Size: Adult Regular)  Pulse 66  Temp 98.3  F (36.8  C) (Oral)   Resp 20  Wt 154 lb (69.9 kg)  LMP 07/16/2018  SpO2 98% Comment: room air  Breastfeeding? No  BMI 24.86 kg/m2  General: No acute distress  Eyes: No conjunctival injection, no scleral icterus.  ENT: No submandibular lymphadenopathy.  No thyromegaly.  Cardiac: Regular rate and rhythm, normal S1/S2, no murmurs of gallops  Respiratory: Clear to auscultation bilaterally.    abdomen: Soft, nondistended.  She does express some discomfort with deep palpation of the epigastrium and states that this is different than the discomfort that prompted her visit today.  Chest wall: Palpation of the sternum and xiphoid process does not reproduce her pain.  The facets of the ribs as they insert into the vertebral bodies nontender the entire length of spine bilaterally.  Extremities: No edema at the ankles bilaterally.  The caliber of the ankles is equal bilaterally.  Psych: Bright affect.    PHQ 9 was reviewed as shown in the flowsheet today.    EKG: My personal interpretation-sinus bradycardia.  No abnormalities.  No ST changes.  No T-wave inversions.    Recent Results (from the past 24 hour(s))   Electrocardiogram Perform and Read   Result Value Ref Range    SYSTOLIC BLOOD PRESSURE  mmHg    DIASTOLIC BLOOD PRESSURE  mmHg    VENTRICULAR RATE 58 BPM    ATRIAL RATE 58 BPM    P-R INTERVAL  140 ms    QRS DURATION 84 ms    Q-T INTERVAL 426 ms    QTC CALCULATION (BEZET) 418 ms    P Axis 5 degrees    R AXIS 33 degrees    T AXIS 12 degrees    MUSE DIAGNOSIS       Sinus bradycardia  Otherwise normal ECG  No previous ECGs available  Confirmed by TROY BLANTON MD LOC:[facility] ([id number]) on 8/13/2018 4:48:16 PM       No results found.    Additional History from Old Records Summarized (2): no  Decision to Obtain Records (1): no  Radiology Tests Summarized or Ordered (1): no  Labs Reviewed or Ordered (1): yes  Medicine Test Summarized or Ordered (1): yes  Independent Review of EKG or X-RAY(2 each): yes    This note has been dictated using voice recognition software. Any grammatical or context distortions are unintentional and inherent to the software
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
## 2021-06-25 NOTE — PROGRESS NOTES
Progress Notes by Ant Marques DO at 2/4/2017  8:10 AM     Author: Ant Marques DO Service: -- Author Type: Physician    Filed: 2/4/2017 12:00 PM Encounter Date: 2/4/2017 Status: Signed    : Ant Marques DO (Physician)       Chief Complaint   Patient presents with   ? Sore Throat     Talked to triage yesterday   ? Ear Pain     bilat. start yesterday       Chief Complaint   Patient presents with   ? Sore Throat     Talked to triage yesterday   ? Ear Pain     bilat. start yesterday        History of Present Illness: Nursing notes reviewed. Patient felt dizzy earlier today, and yesterday she had some nausea. Patient had a fever last night, and has body aches at exam. Her sore throat is her main concern, with it being difficult to swallow.    Review of systems: See history of present illness, otherwise negative.     Current Outpatient Prescriptions   Medication Sig Dispense Refill   ? FLUoxetine (PROZAC) 40 MG capsule Take 2 capsules (80 mg total) by mouth daily. 180 capsule 1   ? uaeuzlzrt-SX-vukjkclj-guaifen (TYLENOL COLD AND FLU SEVERE) 5--200 mg Tab Take by mouth.     ? ranitidine (ZANTAC) 150 MG tablet Take 150 mg by mouth daily as needed for heartburn.     ? cyclobenzaprine (FLEXERIL) 5 MG tablet Take 1-2 tablets (5-10 mg total) by mouth 3 (three) times a day as needed for muscle spasms. 30 tablet 0   ? ibuprofen (ADVIL,MOTRIN) 800 MG tablet Take 800 mg by mouth daily as needed (headache).     ? viscous lidocaine HC (LIDOCAINE) 2 % Soln viscous solution Take 5 mL by mouth every 3 (three) hours as needed. 100 mL 0     No current facility-administered medications for this visit.        Past Medical History:   Diagnosis Date   ? Anxiety    ? Depression    ? Missed ab       Past Surgical History:   Procedure Laterality Date   ? APPENDECTOMY  1998   ? DILATION AND CURETTAGE OF UTERUS N/A 11/23/2015    Procedure: DILATION AND CURETTAGE SUCTION WITH CHROMOSOMES;  Surgeon: Raine Rock DO;   Location: [facility] OR;  Service:       Social History     Social History   ? Marital status:      Spouse name: N/A   ? Number of children: N/A   ? Years of education: N/A     Social History Main Topics   ? Smoking status: Former Smoker     Quit date: 10/5/2015   ? Smokeless tobacco: Never Used   ? Alcohol use No   ? Drug use: No   ? Sexual activity: Not on file     Other Topics Concern   ? Not on file     Social History Narrative       History   Smoking Status   ? Former Smoker   ? Quit date: 10/5/2015   Smokeless Tobacco   ? Never Used      Exam:   Blood pressure 102/60, pulse 84, temperature 98.3  F (36.8  C), temperature source Oral, resp. rate 14, weight 159 lb 11.2 oz (72.4 kg), last menstrual period 01/23/2017, SpO2 99 %, not currently breastfeeding.    EXAM:   General: Vital signs reviewed. Patient is in some distress due to on initial exam, with this being better after treatment in clinic. Breathing is non labored appearing. Patient is alert and oriented x 3.   ENT: Tympanic membranes are clear and without injection bilaterally, nasal turbinates show no injection or rhinorrhea, mild pharyngeal injection without exudate noted.  Neck: supple with no lymphadenopathy  Heart: Normal rate and rhythm without murmur  Lungs: Clear to auscultation with good air flow bilaterally.  Skin: warm and dry  Patient advised of negative rapid strep and flu tests at exam.    Assessment/Plan   1. Throat pain  Rapid Strep A Screen-Throat    Group A Strep, RNA Direct Detection, Throat    viscous lidocaine HC (LIDOCAINE) 2 % Soln viscous solution   2. Nausea  ondansetron disintegrating tablet 8 mg (ZOFRAN-ODT)    Influenza A/B Rapid Test   3. Body aches  Influenza A/B Rapid Test   4. Pharyngitis  viscous lidocaine HC (LIDOCAINE) 2 % Soln viscous solution   5. Infection of the inner ear, bilateral         Patient Instructions     I think all of your symptoms are related to a viral infection, but if the strep test comes  back positive, we will prescribe you an antibiotic. Also see info below. Try aaron for nausea relief. Be seen again in 3-4 days if symptoms are not better, sooner if feeling any worse.      Self-Care for Sore Throats  Sore throats occur for many reasons, such as colds, allergies, and infections caused by viruses or bacteria. In any case, your throat becomes red and sore. Your goal for self-care is to reduce your discomfort while giving your throat a chance to heal.    Moisten and Soothe Your Throat    Try a sip of water first thing after waking up.    Keep your throat moist by drinking 6 or more glasses of clear liquids every day.    Run a cool-air humidifier in your room overnight.    Avoid cigarette smoke.     Suck on throat lozenges, cough drops, hard candy, ice chips, or frozen fruit-juice bars. Use the sugar-free versions if your diet or medical condition require them.  Gargle to Ease Irritation  Gargling every hour or 2 can ease irritation. Try gargling with 1 of these solutions:    1/4 teaspoon of salt in 1/2 cup of warm water    An over-the-counter anesthetic gargle  Use Medication for More Relief  Over-the-counter medication can reduce sore throat symptoms. Ask your pharmacist if you have questions about which medication to use:    Ease pain with anesthetic sprays. Aspirin or an aspirin substitute also helps. Remember, never give aspirin to anyone 18 or younger, or if you are already taking blood thinners.     For sore throats caused by allergies, try antihistamines to block the allergic reaction.    Remember: unless a sore throat is caused by a bacterial infection, antibiotics wont help you.  Prevent Future Sore Throats    Stop smoking or reduce contact with secondhand smoke. Smoke irritates the tender throat lining.    Limit contact with pets and with allergy-causing substances, such as pollen and mold.    When youre around someone with a sore throat or cold, wash your hands frequently to keep viruses or  bacteria from spreading.    Dont strain your vocal cords.  Call Your Health Care Provider  Contact your doctor if you have:    A temperature over 101 F (38.3 C)    White spots on the throat    Great difficulty swallowing    Trouble breathing    A skin rash    Recent exposure to someone else with strep bacteria    Severe hoarseness and swollen glands in the neck or jaw     5455-1489 [employer]. [address] [place] [address]. All rights reserved. This information is not intended as a substitute for professional medical care. Always follow your healthcare professional's instructions.          Inner Ear Problems: Causes of Dizziness (Vertigo)  Benign positional vertigo (BPV)    This is the most common cause of vertigo. BPV (also called benign positional paroxysmal vertigo or BPPV) results when crystals in the canals shift into the wrong position. Episodes usually occur when the head is moved in a certain way. This can happen when turning in bed, bending, or looking up.  BPV:    Causes episodes of vertigo that last for seconds. These episodes can occur several times a day, depending on body position.    Doesnt cause hearing loss.    Often goes away on its own, but may go away sooner with treatment.  Infection or inflammation    Sometimes the semicircular canals swell and send incorrect balance signals. This problem may be caused by a viral infection. Depending on the cause, hearing can be affected (labyrinthitis) or can remain normal (neuronitis).   Infection or inflammation:    Causes episodes of vertigo that last for hours or days. The first episode is usually the worst.    Can cause hearing loss.    Often goes away on its own, but may go away sooner with treatment.    May require vestibular rehabilitation if you have persistent imbalance.  Menieres disease    Although uncommon, this condition happens when there is too much fluid in the canals. This causes increased pressure and swelling, and  affects balance and hearing signals.  Menieres disease may:    Cause episodes of vertigo that last for hours.    Cause fluctuating hearing problems, usually in one ear, that worsen over time.    Cause buzzing or ringing in the ears (tinnitus).    Cause a feeling of fullness or pressure in the ear.    Cause any of these symptoms: vertigo, hearing loss, tinnitus, or ear fullness to last a lifetime.    9197-6283 [employer]. [address] [place] [address]. All rights reserved. This information is not intended as a substitute for professional medical care. Always follow your healthcare professional's instructions.           Ant Marques, DO

## 2021-06-26 ENCOUNTER — HEALTH MAINTENANCE LETTER (OUTPATIENT)
Age: 41
End: 2021-06-26

## 2021-06-29 ENCOUNTER — RECORDS - HEALTHEAST (OUTPATIENT)
Dept: FAMILY MEDICINE | Facility: CLINIC | Age: 41
End: 2021-06-29

## 2021-07-04 NOTE — TELEPHONE ENCOUNTER
Telephone Encounter by Vicky Gallardo RN at 6/29/2021 10:04 PM     Author: Vicky Gallardo RN Service: -- Author Type: Registered Nurse    Filed: 6/29/2021 10:04 PM Encounter Date: 6/29/2021 Status: Signed    : Vicky Gallardo RN (Registered Nurse)       RN cannot approve Refill Request    RN can NOT refill this medication med is not covered by policy/route to provider. Last office visit: 3/2/2020 Mk Valenzuela MD Last Physical: Visit date not found Last MTM visit: Visit date not found Last visit same specialty: 3/2/2020 Mk Valenzuela MD.  Next visit within 3 mo: Visit date not found  Next physical within 3 mo: Visit date not found      Edgardo Sterling Triage/Med Refill 6/29/2021    Requested Prescriptions   Pending Prescriptions Disp Refills   ? FLUoxetine (PROZAC) 40 MG capsule [Pharmacy Med Name: FLUOXETINE HCL 40 MG CAPSULE] 180 capsule 0     Sig: TAKE 2 CAPSULES (80 MG TOTAL) BY MOUTH DAILY. CLINIC VISIT REQUIRED BEFORE ADDITIONAL REFILLS.       SS[facility] Refill Protocol  Passed - 6/29/2021  7:02 PM        Passed - PCP or prescribing provider visit in last year     Last office visit with prescriber/PCP: 3/2/2020 Mk Valenzuela MD OR same dept: Visit date not found OR same specialty: 3/2/2020 Mk Valenzuela MD  Last physical: Visit date not found Last MTM visit: Visit date not found   Next visit within 3 mo: Visit date not found  Next physical within 3 mo: Visit date not found  Prescriber OR PCP: Mk Valenzuela MD  Last diagnosis associated with med order: 1. Moderate episode of recurrent major depressive disorder (H)  - FLUoxetine (PROZAC) 40 MG capsule [Pharmacy Med Name: FLUOXETINE HCL 40 MG CAPSULE]; TAKE 2 CAPSULES (80 MG TOTAL) BY MOUTH DAILY. CLINIC VISIT REQUIRED BEFORE ADDITIONAL REFILLS.  Dispense: 180 capsule; Refill: 0  - naltrexone (DEPADE) 50 mg tablet [Pharmacy Med Name: NALTREXONE 50 MG TABLET]; TAKE 1 TABLET BY MOUTH EVERY DAY  Dispense: 30 tablet;  Refill: 2    2. Alcohol dependence with uncomplicated withdrawal (H)  - naltrexone (DEPADE) 50 mg tablet [Pharmacy Med Name: NALTREXONE 50 MG TABLET]; TAKE 1 TABLET BY MOUTH EVERY DAY  Dispense: 30 tablet; Refill: 2    3. Psychophysiological insomnia  - naltrexone (DEPADE) 50 mg tablet [Pharmacy Med Name: NALTREXONE 50 MG TABLET]; TAKE 1 TABLET BY MOUTH EVERY DAY  Dispense: 30 tablet; Refill: 2    If protocol passes may refill for 12 months if within 3 months of last provider visit (or a total of 15 months).              ? naltrexone (DEPADE) 50 mg tablet [Pharmacy Med Name: NALTREXONE 50 MG TABLET] 30 tablet 2     Sig: TAKE 1 TABLET BY MOUTH EVERY DAY       There is no refill protocol information for this order

## 2021-08-02 ENCOUNTER — VIRTUAL VISIT (OUTPATIENT)
Dept: FAMILY MEDICINE | Facility: CLINIC | Age: 41
End: 2021-08-02
Payer: COMMERCIAL

## 2021-08-02 DIAGNOSIS — F10.11 HISTORY OF ALCOHOL ABUSE: Primary | ICD-10-CM

## 2021-08-02 DIAGNOSIS — F33.1 MODERATE EPISODE OF RECURRENT MAJOR DEPRESSIVE DISORDER (H): ICD-10-CM

## 2021-08-02 PROBLEM — F10.230 ALCOHOL DEPENDENCE WITH UNCOMPLICATED WITHDRAWAL (H): Status: ACTIVE | Noted: 2021-01-29

## 2021-08-02 PROBLEM — F10.230 ALCOHOL DEPENDENCE WITH UNCOMPLICATED WITHDRAWAL (H): Status: RESOLVED | Noted: 2021-01-29 | Resolved: 2021-08-02

## 2021-08-02 PROCEDURE — 99214 OFFICE O/P EST MOD 30 MIN: CPT | Mod: GT | Performed by: FAMILY MEDICINE

## 2021-08-02 RX ORDER — NALTREXONE HYDROCHLORIDE 50 MG/1
50 TABLET, FILM COATED ORAL DAILY
Qty: 30 TABLET | Refills: 2 | Status: SHIPPED | OUTPATIENT
Start: 2021-08-02 | End: 2023-02-03

## 2021-08-02 RX ORDER — VENLAFAXINE HYDROCHLORIDE 37.5 MG/1
112.5 CAPSULE, EXTENDED RELEASE ORAL DAILY
Qty: 90 CAPSULE | Refills: 1 | Status: SHIPPED | OUTPATIENT
Start: 2021-08-02 | End: 2021-09-09

## 2021-08-02 RX ORDER — VENLAFAXINE HYDROCHLORIDE 75 MG/1
75 CAPSULE, EXTENDED RELEASE ORAL DAILY
COMMUNITY
End: 2023-02-03

## 2021-08-02 ASSESSMENT — ANXIETY QUESTIONNAIRES
6. BECOMING EASILY ANNOYED OR IRRITABLE: MORE THAN HALF THE DAYS
GAD7 TOTAL SCORE: 7
4. TROUBLE RELAXING: SEVERAL DAYS
7. FEELING AFRAID AS IF SOMETHING AWFUL MIGHT HAPPEN: NOT AT ALL
2. NOT BEING ABLE TO STOP OR CONTROL WORRYING: SEVERAL DAYS
5. BEING SO RESTLESS THAT IT IS HARD TO SIT STILL: NOT AT ALL
3. WORRYING TOO MUCH ABOUT DIFFERENT THINGS: SEVERAL DAYS
1. FEELING NERVOUS, ANXIOUS, OR ON EDGE: MORE THAN HALF THE DAYS

## 2021-08-02 ASSESSMENT — PATIENT HEALTH QUESTIONNAIRE - PHQ9: SUM OF ALL RESPONSES TO PHQ QUESTIONS 1-9: 6

## 2021-08-02 ASSESSMENT — ENCOUNTER SYMPTOMS: CONSTITUTIONAL NEGATIVE: 1

## 2021-08-02 NOTE — PROGRESS NOTES
Type of service:  Video Visit    Leila Muir is a 40 year old female who is being evaluated via a billable video visit.      How would you like to obtain your AVS? MyChart  If dropped from the video visit, the video invitation should be resent by: Text to cell phone  Will anyone else be joining your video visit? No    Video Start Time: 9:04  Video End Time (time video stopped): 9:24  Originating Location (pt. Location): Home  Distant Location (provider location):  [facility]   Platform used for Video Visit: Well    Assessment/Plan:    History of alcohol abuse  Since I last spoke with the patient, she has relapsed a couple times.  She put herself through 68 total days of inpatient chemical dependency therapy in [facility].  She said that she feels great at this time.  She has found the medication adjustment proposed by the psychiatrist there to be helpful so far.  -Ongoing treatment includes psychotherapy through [facility], intensive outpatient therapy, AA groups.  -Increase venlafaxine dose to a total of 112.5 mg/day.  Follow-up in 3-4 weeks to discuss dose adjustment.  -Continue naltrexone  -Question whether or not is a role for Antabuse or Campral?    Moderate episode of recurrent major depressive disorder (H)  Bupropion was discontinued during her inpatient stay at [facility].  Fluoxetine was discontinued as well.  She is currently on venlafaxine.  Dose increase to 112 mg/day.  She is no longer utilizing trazodone.  Follow-up in 3 to 4 weeks recommended.    Return in about 4 weeks (around 8/30/2021) for Follow up, Depression focused visit, (video visit).    Mk Valenzuela MD  _______________________________    Chief Complaint   Patient presents with     Clinic Care Coordination - Follow-up     medication ( effexor and naltrexone)-please send a text to [phone]     Subjective: Leila Muir is a 40 year old year old female who returns to  "clinic for the following chronic complaints/concerns:     Alcoholism   - [facility]  She was there for 40 days.  \"Absolutely loved it.\"  She relapsed in June (5 days) and went back to [place] for an additional 28 days.  She is no longer on wellbutrin.     - requests refill of effexor and naltrexone.    - going to AA meeting later today.  34 days sober.   No alcohol in the house.    - doing well currently on effexor.  She is in the process of transitioning back to real life.  More depressive symptoms.  There was a tentative plan to increase her dose by 37.5 mg.  She also tested positive for benzos (error likely?).  She says that her motivation has improved and that she is less tired with this medication.  \"Switch in motivation\" with some depressive symptoms.    - she starts intensive outpatient therapy and psychotherapy next week.      -sleep has been improved.     Review of Systems   Constitutional: Negative.         Review of systems negative except as noted in the HPI.   All other systems reviewed and are negative.       Reviewed history: Tobacco    Problems  Med Hx  Surg Hx           Objective:    vitals were not taken for this visit.   Physical Exam  Nursing note reviewed.   Constitutional:       General: She is not in acute distress.     Appearance: Normal appearance. She is not ill-appearing.   HENT:      Head: Normocephalic and atraumatic.   Eyes:      Extraocular Movements: Extraocular movements intact.      Conjunctiva/sclera: Conjunctivae normal.   Pulmonary:      Effort: Pulmonary effort is normal.   Neurological:      Mental Status: She is alert and oriented to person, place, and time.   Psychiatric:         Attention and Perception: Attention normal.         Mood and Affect: Mood normal.         Speech: Speech normal.         Thought Content: Thought content normal.         PHQ 8/2/2021   PHQ-9 Total Score 6   Q9: Thoughts of better off " dead/self-harm past 2 weeks Not at all     PURA-7 SCORE 8/2/2021   Total Score 7     No flowsheet data found.  No flowsheet data found.  No results found for this or any previous visit (from the past 48 hour(s)).  No results found for this visit on 08/02/21.    This note has been dictated using voice recognition software. Any grammatical or context distortions are unintentional and inherent to the software

## 2021-08-02 NOTE — ASSESSMENT & PLAN NOTE
Bupropion was discontinued during her inpatient stay at [facility].  Fluoxetine was discontinued as well.  She is currently on venlafaxine.  Dose increase to 112 mg/day.  She is no longer utilizing trazodone.  Follow-up in 3 to 4 weeks recommended.

## 2021-08-02 NOTE — ASSESSMENT & PLAN NOTE
Since I last spoke with the patient, she has relapsed a couple times.  She put herself through 68 total days of inpatient chemical dependency therapy in [facility].  She said that she feels great at this time.  She has found the medication adjustment proposed by the psychiatrist there to be helpful so far.  -Ongoing treatment includes psychotherapy through [facility], intensive outpatient therapy, AA groups.  -Increase venlafaxine dose to a total of 112.5 mg/day.  Follow-up in 3-4 weeks to discuss dose adjustment.  -Continue naltrexone  -Question whether or not is a role for Antabuse or Campral?

## 2021-08-03 ASSESSMENT — ANXIETY QUESTIONNAIRES: GAD7 TOTAL SCORE: 7

## 2021-10-16 ENCOUNTER — HEALTH MAINTENANCE LETTER (OUTPATIENT)
Age: 41
End: 2021-10-16

## 2022-02-23 ENCOUNTER — MYC MEDICAL ADVICE (OUTPATIENT)
Dept: FAMILY MEDICINE | Facility: CLINIC | Age: 42
End: 2022-02-23
Payer: COMMERCIAL

## 2022-02-23 DIAGNOSIS — F10.11 HISTORY OF ALCOHOL ABUSE: ICD-10-CM

## 2022-02-23 DIAGNOSIS — F33.1 MODERATE EPISODE OF RECURRENT MAJOR DEPRESSIVE DISORDER (H): ICD-10-CM

## 2022-02-23 DIAGNOSIS — B00.1 COLD SORE: Primary | ICD-10-CM

## 2022-02-23 RX ORDER — VENLAFAXINE HYDROCHLORIDE 37.5 MG/1
112.5 CAPSULE, EXTENDED RELEASE ORAL DAILY
Qty: 270 CAPSULE | Refills: 0 | Status: SHIPPED | OUTPATIENT
Start: 2022-02-23 | End: 2022-05-20

## 2022-02-23 NOTE — TELEPHONE ENCOUNTER
"Routing refill request to provider for review/approval because:  PHQ 9 score - not on file/out of date.  Patient needs to be seen because it has been more than 6 months since last office visit.    Last Written Prescription Date:  9/9/21  Last Fill Quantity: 270,  # refills: 1   Last office visit provider:  8/2/21     Requested Prescriptions   Pending Prescriptions Disp Refills     venlafaxine (EFFEXOR-XR) 37.5 MG 24 hr capsule [Pharmacy Med Name: VENLAFAXINE HCL ER 37.5 MG CAP] 270 capsule 1     Sig: TAKE 3 CAPSULES (112.5 MG) BY MOUTH DAILY       Serotonin-Norepinephrine Reuptake Inhibitors  Failed - 2/23/2022  1:02 PM        Failed - PHQ-9 score of less than 5 in past 6 months     Please review last PHQ-9 score.           Failed - Recent (6 mo) or future (30 days) visit within the authorizing provider's specialty     Patient had office visit in the last 6 months or has a visit in the next 30 days with authorizing provider or within the authorizing provider's specialty.  See \"Patient Info\" tab in inbasket, or \"Choose Columns\" in Meds & Orders section of the refill encounter.            Passed - Blood pressure under 140/90 in past 12 months     BP Readings from Last 3 Encounters:   06/04/21 104/68   03/02/20 114/60                 Passed - Medication is active on med list        Passed - Patient is age 18 or older        Passed - No active pregnancy on record        Passed - Normal serum creatinine on file in past 12 months     Recent Labs   Lab Test 06/04/21  0515   CR 0.74       Ok to refill medication if creatinine is low          Passed - No positive pregnancy test in past 12 months             Jose Gan RN 02/23/22 1:03 PM  "

## 2022-02-23 NOTE — TELEPHONE ENCOUNTER
Patient due for med check follow-up.  Please call and schedule.  3-month supply of current dosing sent to pharmacy.

## 2022-02-24 RX ORDER — VALACYCLOVIR HYDROCHLORIDE 1 G/1
2000 TABLET, FILM COATED ORAL 2 TIMES DAILY
Qty: 4 TABLET | Refills: 5 | Status: SHIPPED | OUTPATIENT
Start: 2022-02-24 | End: 2022-02-25

## 2022-02-25 DIAGNOSIS — B00.1 COLD SORE: ICD-10-CM

## 2022-02-25 RX ORDER — VALACYCLOVIR HYDROCHLORIDE 1 G/1
2000 TABLET, FILM COATED ORAL 2 TIMES DAILY
Qty: 30 TABLET | Refills: 1 | Status: SHIPPED | OUTPATIENT
Start: 2022-02-25 | End: 2023-05-26

## 2022-05-19 DIAGNOSIS — F10.11 HISTORY OF ALCOHOL ABUSE: ICD-10-CM

## 2022-05-19 DIAGNOSIS — F33.1 MODERATE EPISODE OF RECURRENT MAJOR DEPRESSIVE DISORDER (H): ICD-10-CM

## 2022-05-20 RX ORDER — VENLAFAXINE HYDROCHLORIDE 37.5 MG/1
112.5 CAPSULE, EXTENDED RELEASE ORAL DAILY
Qty: 270 CAPSULE | Refills: 0 | Status: SHIPPED | OUTPATIENT
Start: 2022-05-20 | End: 2022-12-19

## 2022-05-20 NOTE — TELEPHONE ENCOUNTER
Left message to call back for: Leila  Information to relay to patient: see provider message below and help patient schedule.

## 2022-05-20 NOTE — TELEPHONE ENCOUNTER
"Routing refill request to provider for review/approval because:  phq 9    Last Written Prescription Date:  2/23/22  Last Fill Quantity: 270,  # refills: 0   Last office visit provider:  8/2/21     Requested Prescriptions   Pending Prescriptions Disp Refills     venlafaxine (EFFEXOR XR) 37.5 MG 24 hr capsule [Pharmacy Med Name: VENLAFAXINE HCL ER 37.5 MG CAP] 270 capsule 0     Sig: TAKE 3 CAPSULES (112.5 MG) BY MOUTH DAILY       Serotonin-Norepinephrine Reuptake Inhibitors  Failed - 5/19/2022  1:31 PM        Failed - PHQ-9 score of less than 5 in past 6 months     Please review last PHQ-9 score.           Failed - Recent (6 mo) or future (30 days) visit within the authorizing provider's specialty     Patient had office visit in the last 6 months or has a visit in the next 30 days with authorizing provider or within the authorizing provider's specialty.  See \"Patient Info\" tab in inbasket, or \"Choose Columns\" in Meds & Orders section of the refill encounter.            Passed - Blood pressure under 140/90 in past 12 months     BP Readings from Last 3 Encounters:   06/04/21 104/68   03/02/20 114/60                 Passed - Medication is active on med list        Passed - Patient is age 18 or older        Passed - No active pregnancy on record        Passed - Normal serum creatinine on file in past 12 months     Recent Labs   Lab Test 06/04/21  0515   CR 0.74       Ok to refill medication if creatinine is low          Passed - No positive pregnancy test in past 12 months             Nadege Shah RN 05/20/22 9:58 AM  "

## 2022-05-23 NOTE — TELEPHONE ENCOUNTER
Left message to call back for:Leila  Information to relay to patient: See message below and help schedule.    Left message x2. MyChart message sent.

## 2022-07-17 ENCOUNTER — HEALTH MAINTENANCE LETTER (OUTPATIENT)
Age: 42
End: 2022-07-17

## 2022-09-13 ENCOUNTER — IMMUNIZATION (OUTPATIENT)
Dept: FAMILY MEDICINE | Facility: CLINIC | Age: 42
End: 2022-09-13
Payer: COMMERCIAL

## 2022-09-13 PROCEDURE — 90471 IMMUNIZATION ADMIN: CPT

## 2022-09-13 PROCEDURE — 90686 IIV4 VACC NO PRSV 0.5 ML IM: CPT

## 2022-12-17 DIAGNOSIS — F33.1 MODERATE EPISODE OF RECURRENT MAJOR DEPRESSIVE DISORDER (H): ICD-10-CM

## 2022-12-17 DIAGNOSIS — F10.11 HISTORY OF ALCOHOL ABUSE: ICD-10-CM

## 2022-12-18 NOTE — TELEPHONE ENCOUNTER
"Routing refill request to provider for review/approval because:  Labs not current:  Cr, bp, phq 9    Last Written Prescription Date:  5/20/22  Last Fill Quantity: 270,  # refills: 0   Last office visit provider:  8/2/21     Requested Prescriptions   Pending Prescriptions Disp Refills     venlafaxine (EFFEXOR XR) 37.5 MG 24 hr capsule [Pharmacy Med Name: VENLAFAXINE HCL ER 37.5 MG CAP] 270 capsule 0     Sig: TAKE 3 CAPSULES (112.5 MG) BY MOUTH DAILY       Serotonin-Norepinephrine Reuptake Inhibitors  Failed - 12/17/2022  7:43 AM        Failed - Blood pressure under 140/90 in past 12 months     BP Readings from Last 3 Encounters:   06/04/21 104/68   03/02/20 114/60                 Failed - PHQ-9 score of less than 5 in past 6 months     Please review last PHQ-9 score.           Failed - Normal serum creatinine on file in past 12 months     Recent Labs   Lab Test 06/04/21  0515   CR 0.74       Ok to refill medication if creatinine is low          Failed - Recent (6 mo) or future (30 days) visit within the authorizing provider's specialty     Patient had office visit in the last 6 months or has a visit in the next 30 days with authorizing provider or within the authorizing provider's specialty.  See \"Patient Info\" tab in inbasket, or \"Choose Columns\" in Meds & Orders section of the refill encounter.            Passed - Medication is active on med list        Passed - Patient is age 18 or older        Passed - No active pregnancy on record        Passed - No positive pregnancy test in past 12 months             Nadege Shah RN 12/18/22 2:26 PM  "

## 2022-12-19 RX ORDER — VENLAFAXINE HYDROCHLORIDE 37.5 MG/1
112.5 CAPSULE, EXTENDED RELEASE ORAL DAILY
Qty: 270 CAPSULE | Refills: 0 | Status: SHIPPED | OUTPATIENT
Start: 2022-12-19 | End: 2023-03-12

## 2023-02-03 ENCOUNTER — OFFICE VISIT (OUTPATIENT)
Dept: FAMILY MEDICINE | Facility: CLINIC | Age: 43
End: 2023-02-03
Payer: COMMERCIAL

## 2023-02-03 VITALS
SYSTOLIC BLOOD PRESSURE: 110 MMHG | OXYGEN SATURATION: 99 % | RESPIRATION RATE: 16 BRPM | HEART RATE: 76 BPM | DIASTOLIC BLOOD PRESSURE: 60 MMHG | BODY MASS INDEX: 24.59 KG/M2 | TEMPERATURE: 97.8 F | HEIGHT: 66 IN | WEIGHT: 153 LBS

## 2023-02-03 DIAGNOSIS — F10.11 HISTORY OF ALCOHOL ABUSE: ICD-10-CM

## 2023-02-03 DIAGNOSIS — H69.90 DYSFUNCTION OF EUSTACHIAN TUBE, UNSPECIFIED LATERALITY: ICD-10-CM

## 2023-02-03 DIAGNOSIS — F33.1 MODERATE EPISODE OF RECURRENT MAJOR DEPRESSIVE DISORDER (H): Primary | ICD-10-CM

## 2023-02-03 DIAGNOSIS — J34.89 NOSE SEPTUM PERFORATION: ICD-10-CM

## 2023-02-03 PROBLEM — L98.9 SKIN LESION: Status: ACTIVE | Noted: 2019-01-23

## 2023-02-03 LAB
ALT SERPL W P-5'-P-CCNC: 15 U/L (ref 10–35)
ANION GAP SERPL CALCULATED.3IONS-SCNC: 9 MMOL/L (ref 7–15)
BUN SERPL-MCNC: 8.6 MG/DL (ref 6–20)
CALCIUM SERPL-MCNC: 9.5 MG/DL (ref 8.6–10)
CHLORIDE SERPL-SCNC: 103 MMOL/L (ref 98–107)
CREAT SERPL-MCNC: 0.69 MG/DL (ref 0.51–0.95)
DEPRECATED HCO3 PLAS-SCNC: 27 MMOL/L (ref 22–29)
ERYTHROCYTE [DISTWIDTH] IN BLOOD BY AUTOMATED COUNT: 12.7 % (ref 10–15)
GFR SERPL CREATININE-BSD FRML MDRD: >90 ML/MIN/1.73M2
GLUCOSE SERPL-MCNC: 68 MG/DL (ref 70–99)
HCT VFR BLD AUTO: 37.9 % (ref 35–47)
HGB BLD-MCNC: 13 G/DL (ref 11.7–15.7)
MCH RBC QN AUTO: 30.2 PG (ref 26.5–33)
MCHC RBC AUTO-ENTMCNC: 34.3 G/DL (ref 31.5–36.5)
MCV RBC AUTO: 88 FL (ref 78–100)
PLATELET # BLD AUTO: 349 10E3/UL (ref 150–450)
POTASSIUM SERPL-SCNC: 4 MMOL/L (ref 3.4–5.3)
RBC # BLD AUTO: 4.31 10E6/UL (ref 3.8–5.2)
SODIUM SERPL-SCNC: 139 MMOL/L (ref 136–145)
TSH SERPL DL<=0.005 MIU/L-ACNC: 1.17 UIU/ML (ref 0.3–4.2)
WBC # BLD AUTO: 6.5 10E3/UL (ref 4–11)

## 2023-02-03 PROCEDURE — 84443 ASSAY THYROID STIM HORMONE: CPT | Performed by: FAMILY MEDICINE

## 2023-02-03 PROCEDURE — 84460 ALANINE AMINO (ALT) (SGPT): CPT | Performed by: FAMILY MEDICINE

## 2023-02-03 PROCEDURE — 85027 COMPLETE CBC AUTOMATED: CPT | Performed by: FAMILY MEDICINE

## 2023-02-03 PROCEDURE — 80048 BASIC METABOLIC PNL TOTAL CA: CPT | Performed by: FAMILY MEDICINE

## 2023-02-03 PROCEDURE — 99214 OFFICE O/P EST MOD 30 MIN: CPT | Performed by: FAMILY MEDICINE

## 2023-02-03 PROCEDURE — 36415 COLL VENOUS BLD VENIPUNCTURE: CPT | Performed by: FAMILY MEDICINE

## 2023-02-03 RX ORDER — VENLAFAXINE HYDROCHLORIDE 150 MG/1
150 CAPSULE, EXTENDED RELEASE ORAL DAILY
Qty: 90 CAPSULE | Refills: 1 | Status: SHIPPED | OUTPATIENT
Start: 2023-02-03 | End: 2023-10-13

## 2023-02-03 ASSESSMENT — ANXIETY QUESTIONNAIRES
3. WORRYING TOO MUCH ABOUT DIFFERENT THINGS: SEVERAL DAYS
7. FEELING AFRAID AS IF SOMETHING AWFUL MIGHT HAPPEN: NOT AT ALL
7. FEELING AFRAID AS IF SOMETHING AWFUL MIGHT HAPPEN: NOT AT ALL
GAD7 TOTAL SCORE: 4
8. IF YOU CHECKED OFF ANY PROBLEMS, HOW DIFFICULT HAVE THESE MADE IT FOR YOU TO DO YOUR WORK, TAKE CARE OF THINGS AT HOME, OR GET ALONG WITH OTHER PEOPLE?: SOMEWHAT DIFFICULT
GAD7 TOTAL SCORE: 4
1. FEELING NERVOUS, ANXIOUS, OR ON EDGE: SEVERAL DAYS
6. BECOMING EASILY ANNOYED OR IRRITABLE: SEVERAL DAYS
IF YOU CHECKED OFF ANY PROBLEMS ON THIS QUESTIONNAIRE, HOW DIFFICULT HAVE THESE PROBLEMS MADE IT FOR YOU TO DO YOUR WORK, TAKE CARE OF THINGS AT HOME, OR GET ALONG WITH OTHER PEOPLE: SOMEWHAT DIFFICULT
2. NOT BEING ABLE TO STOP OR CONTROL WORRYING: NOT AT ALL
5. BEING SO RESTLESS THAT IT IS HARD TO SIT STILL: NOT AT ALL
4. TROUBLE RELAXING: SEVERAL DAYS
GAD7 TOTAL SCORE: 4

## 2023-02-03 ASSESSMENT — PATIENT HEALTH QUESTIONNAIRE - PHQ9
SUM OF ALL RESPONSES TO PHQ QUESTIONS 1-9: 7
10. IF YOU CHECKED OFF ANY PROBLEMS, HOW DIFFICULT HAVE THESE PROBLEMS MADE IT FOR YOU TO DO YOUR WORK, TAKE CARE OF THINGS AT HOME, OR GET ALONG WITH OTHER PEOPLE: SOMEWHAT DIFFICULT
SUM OF ALL RESPONSES TO PHQ QUESTIONS 1-9: 7

## 2023-02-03 ASSESSMENT — ENCOUNTER SYMPTOMS: CONSTITUTIONAL NEGATIVE: 1

## 2023-02-03 NOTE — PATIENT INSTRUCTIONS
- nasal saline spray   - apply vaseline    - try flonase.    - use debrox in your right.   - I will refer you to ENT.

## 2023-02-03 NOTE — PROGRESS NOTES
Problem List Items Addressed This Visit     Dysfunction of Eustachian tube, unspecified laterality     The patient was initially concerned that she had cerumen impaction.  She does have some cerumen but its not enough to account for her symptoms.  The tympanic membrane's bilaterally appear retracted.  Unclear if this is related to the phenomenon with the nasal perforation.  I suggested nasal steroid as tolerated.  If not improving, this could be added to the agenda with ENT (may require different subspecialty within the subspecialty).         History of alcohol abuse     Doing fantastic.  19 months sober.  She says that she feels great off of alcohol.         Moderate episode of recurrent major depressive disorder (H) - Primary     Overall, mood improved although she does describe some symptoms of anhedonia.  We discussed potential benefit of a retry of Wellbutrin (previously attempted while she was drinking alcohol versus simply adjusting Effexor.  We will increase her Effexor dose to 150 mg daily.  Recheck of some form recommended in 4 to 6 weeks.         Relevant Medications    venlafaxine (EFFEXOR XR) 150 MG 24 hr capsule    Other Relevant Orders    Basic metabolic panel  (Ca, Cl, CO2, Creat, Gluc, K, Na, BUN)    ALT    CBC with platelets (Completed)    TSH with free T4 reflex    Nose septum perforation     Distant history of recreational use of cocaine.  She did not notice or realize or have perforation of the nasal septum until the past 1-2 years.  Recently, she has been having frequent nosebleeds and discomfort.  - Nasal/sinus care: Nasal saline, Vaseline, consider nasal steroid.  - If not improving, see ENT.  Referral placed.         Relevant Orders    Adult ENT  Referral       Diana Dee is a 42 year old who presents for the following health issues   Chief Complaint   Patient presents with     Medication Update     Nose Problem     A whole.     Hearing Problem     Check ears for wax.     "  Mood  / alcohol:   - 19 months sober. She relates that she is handling stress with work better.    - effexor: helpful.  \"I have notice lately a little more \"lack of motivation.\"\"     - nutrition: \"I eat when I am hungry.\"      - cold temperature makes this worse.     Nose:   - history of cocaine use.  Septal hole. No use of cocaine for years.  Bothersome for ~9 months.  Bloody nose.     Nose Problem    Hearing Problem    History of Present Illness       Mental Health Follow-up:  Patient presents to follow-up on Depression & Anxiety.Patient's depression since last visit has been:  Better  The patient is not having other symptoms associated with depression.  Patient's anxiety since last visit has been:  Good  The patient is not having other symptoms associated with anxiety.  Any significant life events: No  Patient is not feeling anxious or having panic attacks.  Patient has no concerns about alcohol or drug use.    Reason for visit:  Follow up , ears,nose    She eats 0-1 servings of fruits and vegetables daily.She consumes 0 sweetened beverage(s) daily.She exercises with enough effort to increase her heart rate 9 or less minutes per day.  She exercises with enough effort to increase her heart rate 3 or less days per week. She is missing 1 dose(s) of medications per week.    Today's PHQ-9         PHQ-9 Total Score: 7    PHQ-9 Q9 Thoughts of better off dead/self-harm past 2 weeks :   Not at all    How difficult have these problems made it for you to do your work, take care of things at home, or get along with other people: Somewhat difficult  Today's PURA-7 Score: 4     Review of Systems   Constitutional: Negative.    All other systems reviewed and are negative.           Objective    /60 (BP Location: Left arm, Patient Position: Sitting, Cuff Size: Adult Regular)   Pulse 76   Temp 97.8  F (36.6  C) (Oral)   Resp 16   Ht 1.676 m (5' 6\")   Wt 69.4 kg (153 lb)   LMP 01/23/2023   SpO2 99%   BMI 24.69 kg/m  "   Body mass index is 24.69 kg/m .  Physical Exam  Vitals reviewed.   Constitutional:       General: She is not in acute distress.     Appearance: Normal appearance. She is not ill-appearing.   HENT:      Head: Normocephalic and atraumatic.      Right Ear: External ear normal.      Left Ear: External ear normal.      Nose: Nose normal.      Mouth/Throat:      Pharynx: Oropharynx is clear. No oropharyngeal exudate or posterior oropharyngeal erythema.   Eyes:      General: No scleral icterus.        Right eye: No discharge.         Left eye: No discharge.      Extraocular Movements: Extraocular movements intact.      Conjunctiva/sclera: Conjunctivae normal.      Pupils: Pupils are equal, round, and reactive to light.   Neck:      Comments: No thyromegaly.  Cardiovascular:      Rate and Rhythm: Normal rate and regular rhythm.      Heart sounds: Normal heart sounds. No murmur heard.    No friction rub. No gallop.   Pulmonary:      Effort: Pulmonary effort is normal. No respiratory distress.      Breath sounds: Normal breath sounds. No wheezing or rales.   Abdominal:      General: There is no distension.      Palpations: Abdomen is soft. There is no mass.      Tenderness: There is no abdominal tenderness.   Musculoskeletal:         General: No signs of injury. Normal range of motion.      Cervical back: Normal range of motion.      Right lower leg: No edema.      Left lower leg: No edema.   Lymphadenopathy:      Cervical: No cervical adenopathy.   Skin:     General: Skin is warm.      Coloration: Skin is not jaundiced.      Findings: No rash.   Neurological:      General: No focal deficit present.      Mental Status: She is alert and oriented to person, place, and time.      Cranial Nerves: No cranial nerve deficit.      Deep Tendon Reflexes: Reflexes normal.   Psychiatric:         Mood and Affect: Mood normal.                This note has been dictated using voice recognition software. Any grammatical or context  distortions are unintentional and inherent to the software      Answers for HPI/ROS submitted by the patient on 2/3/2023  If you checked off any problems, how difficult have these problems made it for you to do your work, take care of things at home, or get along with other people?: Somewhat difficult  PHQ9 TOTAL SCORE: 7

## 2023-02-03 NOTE — ASSESSMENT & PLAN NOTE
The patient was initially concerned that she had cerumen impaction.  She does have some cerumen but its not enough to account for her symptoms.  The tympanic membrane's bilaterally appear retracted.  Unclear if this is related to the phenomenon with the nasal perforation.  I suggested nasal steroid as tolerated.  If not improving, this could be added to the agenda with ENT (may require different subspecialty within the subspecialty).

## 2023-02-03 NOTE — ASSESSMENT & PLAN NOTE
Distant history of recreational use of cocaine.  She did not notice or realize or have perforation of the nasal septum until the past 1-2 years.  Recently, she has been having frequent nosebleeds and discomfort.  - Nasal/sinus care: Nasal saline, Vaseline, consider nasal steroid.  - If not improving, see ENT.  Referral placed.

## 2023-05-19 ENCOUNTER — OFFICE VISIT (OUTPATIENT)
Dept: AUDIOLOGY | Facility: CLINIC | Age: 43
End: 2023-05-19
Payer: COMMERCIAL

## 2023-05-19 DIAGNOSIS — H93.13 TINNITUS OF BOTH EARS: Primary | ICD-10-CM

## 2023-05-19 PROCEDURE — 92557 COMPREHENSIVE HEARING TEST: CPT | Performed by: AUDIOLOGIST

## 2023-05-19 PROCEDURE — 92550 TYMPANOMETRY & REFLEX THRESH: CPT | Performed by: AUDIOLOGIST

## 2023-05-19 NOTE — PROGRESS NOTES
AUDIOLOGY REPORT    SUMMARY: Audiology visit completed. See audiogram for results.      RECOMMENDATIONS: Follow-up with ENT.    Beryl Sanchez, CCC-A  [place] Licensed Audiologist #[id number]

## 2023-06-26 ENCOUNTER — MYC MEDICAL ADVICE (OUTPATIENT)
Dept: FAMILY MEDICINE | Facility: CLINIC | Age: 43
End: 2023-06-26
Payer: COMMERCIAL

## 2023-08-05 ENCOUNTER — HEALTH MAINTENANCE LETTER (OUTPATIENT)
Age: 43
End: 2023-08-05

## 2023-08-12 NOTE — ASSESSMENT & PLAN NOTE
Overall, mood improved although she does describe some symptoms of anhedonia.  We discussed potential benefit of a retry of Wellbutrin (previously attempted while she was drinking alcohol versus simply adjusting Effexor.  We will increase her Effexor dose to 150 mg daily.  Recheck of some form recommended in 4 to 6 weeks.   Spoke with the patient and confirmed his date of birth. Reviewed throat culture result. Patient is aware that he tested positive for group C beta strep. Denies any fever however sore throat still persists. I informed that I would send a prescription for Zithromax Z-ASHLEY. If symptoms persist after finishing the course, he is to follow-up with primary care provider. He expressed understanding.   Prescription called into [address]

## 2023-08-23 ENCOUNTER — TRANSFERRED RECORDS (OUTPATIENT)
Dept: HEALTH INFORMATION MANAGEMENT | Facility: CLINIC | Age: 43
End: 2023-08-23
Payer: COMMERCIAL

## 2023-10-13 DIAGNOSIS — F33.1 MODERATE EPISODE OF RECURRENT MAJOR DEPRESSIVE DISORDER (H): ICD-10-CM

## 2023-10-13 RX ORDER — VENLAFAXINE HYDROCHLORIDE 150 MG/1
150 CAPSULE, EXTENDED RELEASE ORAL DAILY
Qty: 87 CAPSULE | Refills: 1 | Status: SHIPPED | OUTPATIENT
Start: 2023-10-13 | End: 2024-04-30

## 2023-12-25 ENCOUNTER — E-VISIT (OUTPATIENT)
Dept: FAMILY MEDICINE | Facility: CLINIC | Age: 43
End: 2023-12-25
Payer: COMMERCIAL

## 2023-12-25 DIAGNOSIS — H10.32 ACUTE CONJUNCTIVITIS OF LEFT EYE, UNSPECIFIED ACUTE CONJUNCTIVITIS TYPE: Primary | ICD-10-CM

## 2023-12-25 PROCEDURE — 99421 OL DIG E/M SVC 5-10 MIN: CPT | Performed by: FAMILY MEDICINE

## 2023-12-26 RX ORDER — POLYMYXIN B SULFATE AND TRIMETHOPRIM 1; 10000 MG/ML; [USP'U]/ML
SOLUTION OPHTHALMIC
Qty: 10 ML | Refills: 0 | Status: SHIPPED | OUTPATIENT
Start: 2023-12-26 | End: 2024-01-02

## 2023-12-26 NOTE — PATIENT INSTRUCTIONS
Pink eye is usually viral or inflammatory.  I you still have symptoms in 2-3 days, please fill the antibiotic drops that I sent to the pharmacy.      Please reply/call with questions.    Mk Valenzuela MD    (The above note was created using dictation software.  Despite my best efforts, irregularities of text may appear in my messages.  Please excuse these errors.  The software will frequently confuse pronouns such as he, she, you, etc.)

## 2024-02-05 ENCOUNTER — ANCILLARY PROCEDURE (OUTPATIENT)
Dept: MAMMOGRAPHY | Facility: CLINIC | Age: 44
End: 2024-02-05
Attending: FAMILY MEDICINE
Payer: COMMERCIAL

## 2024-02-05 DIAGNOSIS — Z12.31 SCREENING MAMMOGRAM, ENCOUNTER FOR: ICD-10-CM

## 2024-02-05 PROCEDURE — 77067 SCR MAMMO BI INCL CAD: CPT

## 2024-02-08 ENCOUNTER — ANCILLARY PROCEDURE (OUTPATIENT)
Dept: MAMMOGRAPHY | Facility: CLINIC | Age: 44
End: 2024-02-08
Attending: FAMILY MEDICINE
Payer: COMMERCIAL

## 2024-02-08 DIAGNOSIS — N64.89 BREAST ASYMMETRY: ICD-10-CM

## 2024-02-08 PROCEDURE — 77062 BREAST TOMOSYNTHESIS BI: CPT

## 2024-03-01 ENCOUNTER — E-VISIT (OUTPATIENT)
Dept: FAMILY MEDICINE | Facility: CLINIC | Age: 44
End: 2024-03-01
Payer: COMMERCIAL

## 2024-03-01 DIAGNOSIS — L30.9 DERMATITIS: Primary | ICD-10-CM

## 2024-03-01 PROCEDURE — 99207 PR NON-BILLABLE SERV PER CHARTING: CPT | Performed by: FAMILY MEDICINE

## 2024-03-01 RX ORDER — TRIAMCINOLONE ACETONIDE 0.25 MG/G
OINTMENT TOPICAL 2 TIMES DAILY
Qty: 80 G | Refills: 1 | Status: SHIPPED | OUTPATIENT
Start: 2024-03-01 | End: 2024-08-08

## 2024-03-01 NOTE — PATIENT INSTRUCTIONS
Dear Leila Muir    I think it is reasonable to start therapy with a moderate potency steroid called triamcinolone.  This was sent to the pharmacy.  If you do not improve, or get worse, let me know.  There is an alternative diagnosis called perioral dermatitis which often will actually get worse with a steroid and is treating with topical and oral antibiotics.    Do not scratch your rash.Bacteria from your fingernails may enter your open sores during scratching and cause an infection.     Use moisturizes or emollients, such as petroleum jelly.These help relieve itching and help prevent bacteria from getting in your sores. If you have a doctor's order for medicated cream, apply that first. Then apply the moisturizer or emollient on top.    Thanks for choosing us as your health care partner,    Mk Valenzuela MD

## 2024-03-04 ASSESSMENT — ANXIETY QUESTIONNAIRES
5. BEING SO RESTLESS THAT IT IS HARD TO SIT STILL: NOT AT ALL
3. WORRYING TOO MUCH ABOUT DIFFERENT THINGS: NOT AT ALL
1. FEELING NERVOUS, ANXIOUS, OR ON EDGE: SEVERAL DAYS
2. NOT BEING ABLE TO STOP OR CONTROL WORRYING: NOT AT ALL
GAD7 TOTAL SCORE: 3
8. IF YOU CHECKED OFF ANY PROBLEMS, HOW DIFFICULT HAVE THESE MADE IT FOR YOU TO DO YOUR WORK, TAKE CARE OF THINGS AT HOME, OR GET ALONG WITH OTHER PEOPLE?: SOMEWHAT DIFFICULT
7. FEELING AFRAID AS IF SOMETHING AWFUL MIGHT HAPPEN: NOT AT ALL
GAD7 TOTAL SCORE: 3
7. FEELING AFRAID AS IF SOMETHING AWFUL MIGHT HAPPEN: NOT AT ALL
IF YOU CHECKED OFF ANY PROBLEMS ON THIS QUESTIONNAIRE, HOW DIFFICULT HAVE THESE PROBLEMS MADE IT FOR YOU TO DO YOUR WORK, TAKE CARE OF THINGS AT HOME, OR GET ALONG WITH OTHER PEOPLE: SOMEWHAT DIFFICULT
4. TROUBLE RELAXING: SEVERAL DAYS
6. BECOMING EASILY ANNOYED OR IRRITABLE: SEVERAL DAYS

## 2024-03-04 ASSESSMENT — PATIENT HEALTH QUESTIONNAIRE - PHQ9
SUM OF ALL RESPONSES TO PHQ QUESTIONS 1-9: 3
SUM OF ALL RESPONSES TO PHQ QUESTIONS 1-9: 3
10. IF YOU CHECKED OFF ANY PROBLEMS, HOW DIFFICULT HAVE THESE PROBLEMS MADE IT FOR YOU TO DO YOUR WORK, TAKE CARE OF THINGS AT HOME, OR GET ALONG WITH OTHER PEOPLE: SOMEWHAT DIFFICULT

## 2024-03-05 ENCOUNTER — ANCILLARY PROCEDURE (OUTPATIENT)
Dept: GENERAL RADIOLOGY | Facility: CLINIC | Age: 44
End: 2024-03-05
Payer: COMMERCIAL

## 2024-03-05 ENCOUNTER — OFFICE VISIT (OUTPATIENT)
Dept: FAMILY MEDICINE | Facility: CLINIC | Age: 44
End: 2024-03-05
Payer: COMMERCIAL

## 2024-03-05 VITALS
WEIGHT: 159.6 LBS | TEMPERATURE: 98.5 F | BODY MASS INDEX: 25.65 KG/M2 | RESPIRATION RATE: 16 BRPM | HEART RATE: 86 BPM | OXYGEN SATURATION: 99 % | SYSTOLIC BLOOD PRESSURE: 115 MMHG | DIASTOLIC BLOOD PRESSURE: 82 MMHG | HEIGHT: 66 IN

## 2024-03-05 DIAGNOSIS — L71.0 PERIORAL DERMATITIS: Primary | ICD-10-CM

## 2024-03-05 DIAGNOSIS — G89.29 CHRONIC PAIN OF LEFT KNEE: ICD-10-CM

## 2024-03-05 DIAGNOSIS — M25.562 CHRONIC PAIN OF LEFT KNEE: ICD-10-CM

## 2024-03-05 PROCEDURE — 73562 X-RAY EXAM OF KNEE 3: CPT | Mod: TC | Performed by: STUDENT IN AN ORGANIZED HEALTH CARE EDUCATION/TRAINING PROGRAM

## 2024-03-05 PROCEDURE — 90471 IMMUNIZATION ADMIN: CPT

## 2024-03-05 PROCEDURE — 99213 OFFICE O/P EST LOW 20 MIN: CPT | Mod: 25

## 2024-03-05 PROCEDURE — 90715 TDAP VACCINE 7 YRS/> IM: CPT

## 2024-03-05 RX ORDER — ERYTHROMYCIN 20 MG/G
GEL TOPICAL DAILY
Qty: 60 G | Refills: 0 | Status: SHIPPED | OUTPATIENT
Start: 2024-03-05 | End: 2024-03-08 | Stop reason: ALTCHOICE

## 2024-03-05 NOTE — ASSESSMENT & PLAN NOTE
Patient presents with approximately 4 months of intermittent, mild knee pain. Her physical exam is unrevealing of any joint instability but she is tender along the medial joint line. X-ray obtained today in clinic shows normal joint alignment. Most suspicious of an overuse injury versus mild degenerative disease given her recent history of increased physical activity ([employer]). No known injury and again, no joint instability therefore a major ligamentous injury seems less likely although cannot be ruled out. Would recommend 1-2 months of physical therapy in addition to a short duration of regular administration of anti-inflammatories. She can utilize heat and ice as they are helpful. Would recommend avoidance of activities that are particularly stressful to the knees including jumping. If not having improvement despite this plan, consider advanced imaging and/or referral to sports medicine. Patient expressed an understanding of and agreement with this plan. All questions were answered.

## 2024-03-05 NOTE — LETTER
March 5, 2024      Leila Muir  [address]  [facility] [address]        To Whom It May Concern:    Leila Muir  was seen on 3/5/2024.  I have recommended no lower body exercise for today; please excuse her from [employer].        Sincerely,        SAIMA Mcwilliams CNP

## 2024-03-05 NOTE — ASSESSMENT & PLAN NOTE
Exam significant for papular, inflamed lesions surrounding mouth that spare vermilion border, consistent with perioral dermatitis. Lesions have increased in number since her E-Visit. Will treat with erythromycin and monitor for improvement. Consider consultation with dermatology if not improving.

## 2024-03-05 NOTE — PROGRESS NOTES
Assessment & Plan   Problem List Items Addressed This Visit       Perioral dermatitis - Primary     Exam significant for papular, inflamed lesions surrounding mouth that spare vermilion border, consistent with perioral dermatitis. Lesions have increased in number since her E-Visit. Will treat with erythromycin and monitor for improvement. Consider consultation with dermatology if not improving.         Relevant Medications    erythromycin with ethanol (EMGEL) 2 % gel    Chronic pain of left knee     Patient presents with approximately 4 months of intermittent, mild knee pain. Her physical exam is unrevealing of any joint instability but she is tender along the medial joint line. X-ray obtained today in clinic shows normal joint alignment. Most suspicious of an overuse injury versus mild degenerative disease given her recent history of increased physical activity ([employer]). No known injury and again, no joint instability therefore a major ligamentous injury seems less likely although cannot be ruled out. Would recommend 1-2 months of physical therapy in addition to a short duration of regular administration of anti-inflammatories. She can utilize heat and ice as they are helpful. Would recommend avoidance of activities that are particularly stressful to the knees including jumping. If not having improvement despite this plan, consider advanced imaging and/or referral to sports medicine. Patient expressed an understanding of and agreement with this plan. All questions were answered.         Relevant Orders    XR Knee Left 3 Views    Physical Therapy  Referral      Subjective   Leila is a 43 year old, presenting for the following health issues:  Knee Pain (Left knee NKI  4 months intermittent worse when getting up from floor or exercising ) and Derm Problem (Rash on face see e-visit )        3/5/2024     8:46 AM   Additional Questions   Roomed by ac   Accompanied by self     OV to discuss left knee  "pain:  -First noticed around Thanksgiving  -On and off symptoms since then  -Pain is located along the anterior, lateral aspect that she can reproduce with palpation. With bending and movement she will notice pain throughout the entire knee joint.  -Difficulty straightening or bending the left knee.  -Cannot identify any consistent alleviating factors. Has taken ibuprofen.  -She has tried massage therapy  -Denies paresthesias in the left leg. Cracks and pops when she walks upstairs. Some subjective weakness at times. One episode where she was hit by her dog and her knee 'collapsed' and other times where the knee feels 'stuck.'     Facial rash:  -History of acne  -Has utilized moisturizer after a laser (which was done AFTER the appearance of the rash) and some other over the counter acne treatments.  -Bumpy and dry. Not painful. No drainage.   -Sister is a nurse; told her it looks like perioral dermatitis    History of Present Illness       Reason for visit:  Knee pain  Symptom onset:  More than a month  Symptoms include:  Soreness, tight  Symptom intensity:  Moderate  Symptom progression:  Staying the same  Had these symptoms before:  No  What makes it worse:  Exercise but not always.    She eats 0-1 servings of fruits and vegetables daily.She consumes 0 sweetened beverage(s) daily.She exercises with enough effort to increase her heart rate 30 to 60 minutes per day.  She exercises with enough effort to increase her heart rate 4 days per week.   She is taking medications regularly.         Objective    /82 (BP Location: Left arm, Patient Position: Sitting, Cuff Size: Adult Regular)   Pulse 86   Temp 98.5  F (36.9  C) (Oral)   Resp 16   Ht 1.676 m (5' 6\")   Wt 72.4 kg (159 lb 9.6 oz)   LMP 02/20/2024 (Approximate)   SpO2 99%   Breastfeeding No   BMI 25.76 kg/m    Body mass index is 25.76 kg/m .    Physical Exam  Vitals and nursing note reviewed.   Constitutional:       Appearance: Normal appearance. "   Cardiovascular:      Rate and Rhythm: Normal rate and regular rhythm.   Pulmonary:      Effort: Pulmonary effort is normal. No respiratory distress.   Musculoskeletal:      Right knee: Normal.      Left knee: No swelling, deformity, effusion, erythema, ecchymosis, bony tenderness or crepitus. Normal range of motion. Tenderness present over the medial joint line. No ACL or patellar tendon tenderness. No MCL laxity or ACL laxity.Normal pulse.   Skin:     Comments: Facial rash: Papular, erythematous lesions present to perioral region, L>R. No drainage.   Neurological:      Mental Status: She is alert.        Xray - Reviewed and interpreted by me.  Left knee: Normal      Signed Electronically by: SAIMA Mcwilliams CNP

## 2024-03-08 ENCOUNTER — OFFICE VISIT (OUTPATIENT)
Dept: MIDWIFE SERVICES | Facility: CLINIC | Age: 44
End: 2024-03-08
Payer: COMMERCIAL

## 2024-03-08 VITALS
DIASTOLIC BLOOD PRESSURE: 64 MMHG | HEART RATE: 72 BPM | BODY MASS INDEX: 25.71 KG/M2 | SYSTOLIC BLOOD PRESSURE: 102 MMHG | WEIGHT: 160 LBS | HEIGHT: 66 IN

## 2024-03-08 DIAGNOSIS — L71.0 PERIORAL DERMATITIS: Primary | ICD-10-CM

## 2024-03-08 DIAGNOSIS — Z13.1 SCREENING FOR DIABETES MELLITUS: ICD-10-CM

## 2024-03-08 DIAGNOSIS — R39.89 ABNORMAL UROGENITAL DISCHARGE: ICD-10-CM

## 2024-03-08 DIAGNOSIS — Z01.419 ENCOUNTER FOR GYNECOLOGICAL EXAMINATION WITHOUT ABNORMAL FINDING: Primary | ICD-10-CM

## 2024-03-08 DIAGNOSIS — Z13.220 LIPID SCREENING: ICD-10-CM

## 2024-03-08 DIAGNOSIS — Z11.3 ROUTINE SCREENING FOR STI (SEXUALLY TRANSMITTED INFECTION): ICD-10-CM

## 2024-03-08 DIAGNOSIS — Z12.4 SCREENING FOR MALIGNANT NEOPLASM OF CERVIX: ICD-10-CM

## 2024-03-08 PROBLEM — F33.9 MAJOR DEPRESSIVE DISORDER, RECURRENT EPISODE (H): Status: ACTIVE | Noted: 2024-03-08

## 2024-03-08 PROBLEM — B00.9 HERPES: Status: ACTIVE | Noted: 2024-03-08

## 2024-03-08 PROBLEM — L98.9 SKIN LESION: Status: RESOLVED | Noted: 2019-01-23 | Resolved: 2024-03-08

## 2024-03-08 LAB
CLUE CELLS: ABNORMAL
TRICHOMONAS, WET PREP: ABNORMAL
WBC'S/HIGH POWER FIELD, WET PREP: ABNORMAL
YEAST, WET PREP: ABNORMAL

## 2024-03-08 PROCEDURE — 87210 SMEAR WET MOUNT SALINE/INK: CPT | Performed by: ADVANCED PRACTICE MIDWIFE

## 2024-03-08 PROCEDURE — 87624 HPV HI-RISK TYP POOLED RSLT: CPT | Performed by: ADVANCED PRACTICE MIDWIFE

## 2024-03-08 PROCEDURE — 99386 PREV VISIT NEW AGE 40-64: CPT | Performed by: ADVANCED PRACTICE MIDWIFE

## 2024-03-08 PROCEDURE — 87491 CHLMYD TRACH DNA AMP PROBE: CPT | Performed by: ADVANCED PRACTICE MIDWIFE

## 2024-03-08 PROCEDURE — G0145 SCR C/V CYTO,THINLAYER,RESCR: HCPCS | Performed by: ADVANCED PRACTICE MIDWIFE

## 2024-03-08 PROCEDURE — 87591 N.GONORRHOEAE DNA AMP PROB: CPT | Performed by: ADVANCED PRACTICE MIDWIFE

## 2024-03-08 RX ORDER — PIMECROLIMUS 10 MG/G
CREAM TOPICAL 2 TIMES DAILY
Qty: 60 G | Refills: 0 | Status: SHIPPED | OUTPATIENT
Start: 2024-03-08 | End: 2024-04-05

## 2024-03-08 RX ORDER — ERYTHROMYCIN 20 MG/G
GEL TOPICAL 2 TIMES DAILY
COMMUNITY
End: 2024-08-08

## 2024-03-08 ASSESSMENT — PATIENT HEALTH QUESTIONNAIRE - PHQ9: SUM OF ALL RESPONSES TO PHQ QUESTIONS 1-9: 1

## 2024-03-08 NOTE — PROGRESS NOTES
Subjective:     Leila Muir is a 43 year old female who presents for an annual exam. She is an existing [facility] [place] patient but new to [facility] care. Shares  she is not necessarily planning a pregnancy but if she were to get pregnant she would be open to that.     Shares concern for vaginal odor present for possibly 1 year, notes more significant after exercise. Daily routine with exercise sometimes delays changing clothing. Reports no other s/sx of vaginitis.     Shares she had a long gap in routine care with history significant for alcohol dependence and treatment 2021.   Also notes a history of difficult SAB and D&C that made it difficult to return to care.  She would like to reestablish care for routine annual exams.      Family: , daughter Cheyenne is 10 yo  Work:analyst  Other:     Healthy Habits:   Exercise: 3 times a week, orange theory  Diet: shares increase in dietary sugars overall, maintaining weight but not losing weight  Safety (seatbelt, gun access, IPV, hx abuse): negative screen  Tobacco/Alcohol/Drug Use: none, former smoker with history 0.5 pack per day  Anxiety Depression Screening: negative PHQ9: 1, not difficult  Sexual Partners: 1 male, spouse, monogamous  Last Breast Exam: unk    Colonoscopy: N/A  Lipid Profile:  unk, not fasting and accepts fasting future orders  Glucose Screen:  will complete when fasting  Prevention of Osteoporosis:  dietary sources calcium  Last Dexa: N/A    Answers submitted by the patient for this visit:  PURA-7 (Submitted on 3/4/2024)  PURA 7 TOTAL SCORE: 3    Pura-7 ([employer],2002; Used With Permission)    3/4/2024  9:35 AM CST - Filed by Patient   Over the last two weeks, how often have you been bothered by the following problems?    1. Feeling nervous, anxious, or on edge Several days   2. Not being able to stop or control worrying Not at all   3. Worrying too much about different things Not at all   4. Trouble relaxing Several days   5. Being so  restless that it is hard to sit still Not at all   6. Becoming easily annoyed or irritable Several days   7. Feeling afraid, as if something awful might happen Not at all   If you checked off any problems on this questionnaire, how difficult have these problems made it for you to do your work, take care of things at home, or get along with other people? Somewhat difficult   PURA 7 TOTAL SCORE (range: 0 - 21) 3 (minimal anxiety)     Myc Communicable Disease Screening    3/4/2024  9:35 AM CST - Filed by Patient   Do you have any of the following new or worsening symptoms ? None of these   Have you had close contact with someone who has traveled outside the country in the past 30 days and is sick? No     Menstrual History    3/4/2024  9:36 AM CST - Filed by Patient   How old were you when your periods began? 13   Are you currently having periods? Yes   Are your periods regular or irregular? Regular   Do you have any of the following: no symptoms   Are you pregnant? No        Immunization History   Administered Date(s) Administered    COVID-19 Vaccine ([employer]) 2021    Flu-nasal, Unspecified 2013    Influenza Vaccine >6 months,quad, PF 11/10/2016, 2017, 10/17/2018, 10/24/2020, 2022    TDAP (Adacel,Boostrix) 2011, 2013, 2024     Immunization status: declined flu and covid immunizations today and documented.    Gynecologic History  Patient's last menstrual period was 2024 (approximate).  Contraception: no method    Cancer screening:   Last Pap:  . Results were: NIL and HPV positive 16; shares has not had a pap since    Last mammogram: 2024. Results were: normal, extremely dense breast tissue      OB History    Para Term  AB Living   3 1 1 0 2 1   SAB IAB Ectopic Multiple Live Births   1 0 1 0 1      # Outcome Date GA Lbr Quinten/2nd Weight Sex Delivery Anes PTL Lv   3 2014     AB, MISSED         Birth Comments: d&c x 2   2 Term 13     Vag-Spont   MARKUS       Name: Cheyenne Almanzar Ectopic 06/2012              Birth Comments: methotrexate       Current Outpatient Medications   Medication Sig Dispense Refill    erythromycin with ethanol (EMGEL) 2 % gel Apply topically 2 times daily      venlafaxine (EFFEXOR XR) 150 MG 24 hr capsule TAKE 1 CAPSULE BY MOUTH EVERY DAY 87 capsule 1    pimecrolimus (ELIDEL) 1 % external cream Apply topically 2 times daily for 28 days (Patient not taking: Reported on 3/8/2024) 60 g 0    triamcinolone (KENALOG) 0.025 % external ointment Apply topically 2 times daily (Patient not taking: Reported on 3/8/2024) 80 g 1     Past Medical History:   Diagnosis Date    Anxiety     Depression     Lower abdominal pain 3/3/2020    Missed ab      Past Surgical History:   Procedure Laterality Date    APPENDECTOMY  1998    DILATION AND CURETTAGE N/A 11/23/2015    Procedure: DILATION AND CURETTAGE SUCTION WITH CHROMOSOMES;  Surgeon: Raine Rock DO;  Location: [facility];  Service:      Patient has no known allergies.  Family History   Problem Relation Age of Onset    No Known Problems Mother     Cerebrovascular Disease Father 61.00    Coronary Artery Disease Father 63.00    Heart Disease Father     No Known Problems Sister     No Known Problems Daughter     Breast Cancer Maternal Grandmother         early 60s.    Hypertrophic cardiomyopathy No family hx of     Coronary Artery Disease No family hx of     Colon Cancer No family hx of     Diabetes No family hx of      Social History     Socioeconomic History    Marital status:      Spouse name: Not on file    Number of children: Not on file    Years of education: Not on file    Highest education level: Not on file   Occupational History    Not on file   Tobacco Use    Smoking status: Former     Packs/day: .5     Types: Cigarettes     Quit date: 1/1/2021     Years since quitting: 3.1    Smokeless tobacco: Never    Tobacco comments:     Vapes daily   Vaping Use    Vaping Use: Every day     "Substances: Nicotine    Devices: Disposable   Substance and Sexual Activity    Alcohol use: Not Currently     Comment: Sober since 6-    Drug use: Never    Sexual activity: Not on file   Other Topics Concern    Not on file   Social History Narrative    Not on file     Social Determinants of Health     Financial Resource Strain: Not on file   Food Insecurity: Not on file   Transportation Needs: Not on file   Physical Activity: Not on file   Stress: Not on file   Social Connections: Not on file   Interpersonal Safety: Low Risk  (3/8/2024)    Interpersonal Safety     Do you feel physically and emotionally safe where you currently live?: Yes     Within the past 12 months, have you been hit, slapped, kicked or otherwise physically hurt by someone?: No     Within the past 12 months, have you been humiliated or emotionally abused in other ways by your partner or ex-partner?: No   Housing Stability: Not on file       Review of Systems  General:  Denies problem  Eyes: Denies problem  Ears/Nose/Throat: Denies problem  Cardiovascular: Denies problem  Respiratory:  Denies problem  Gastrointestinal:  denies problems  Genitourinary:  as above  Musculoskeletal:  Denies problem  Skin: Denies problem  Neurologic:denies problems  Psychiatric: denies problems  Endocrine: denies problems        Objective:      Vitals:    03/08/24 0903   BP: 102/64   Pulse: 72   Weight: 72.6 kg (160 lb)   Height: 1.67 m (5' 5.75\")       Physical Exam:  General Appearance: Alert, cooperative, no distress, appears stated age  Skin: perioral dermatitis with active treatment applied; Skin color, texture, turgor normal, no rashes or lesions. no varicosities.  Throat: Lips, mucosa, and tongue normal; teeth and gums normal  HEENT: grossly normal; otoscopic and opthalmic exam not performed.   Neck: Supple, symmetrical, trachea midline, no adenopathy;  thyroid: not enlarged, symmetric, no tenderness/mass/nodules  Lungs: Clear to auscultation bilaterally, " respirations unlabored  Breasts: No breast masses, tenderness, asymmetry, or nipple discharge .   Heart: Regular rate and rhythm, S1 and S2 normal, no murmur, rub, or gallop  Abdomen: Soft, non-tender. No organomegaly or masses  Pelvic:External genitalia normal without lesions or irritation. Vagina and cervix with scant homogenous white discharge show no lesions, inflammation, or tenderness. No cystocele, No rectocele. Uterus & adnexal normal without masses or tenderness pap smear, wet prep, GC/CT swabs obtained.    Assessment:     Healthy female exam.  Routine STI screening  Cervical cancer screening  contraception counseling offered  Abnormal vaginal odor     Plan:      1. Discussed nutrition and exercise.  Advised Multivitamin, Vitamin D3 4000IU geltab and an omega 3 supplement daily. Discussed importance of calcium. Discussed importance of intentional exercise but also discussed myths about benefits of only vigorous vs benefits of even moderate exercise and encouraged her efforts.   2.  Labs: STI screening: wet prep, Hep B, HIV, RPR, GC/chlamydia, and Hep C, vaginitis screening: wet prep, Future fasting labs: lipid profile and blood glucose, and declines all Health Maintenance screening labs.  3. Breast awareness reviewed and patient encouraged to contact her provider with concerns.   4. Contraception: no method; declines today.  5. Pap smear done at today's visit. Next due for cervical cancer screening based on results. As she had rare screening prior to today's visit may benefit from colposcopy regardless of results.  6.  RTC 1 year for annual physical exam, PRN    50 minutes on the date of the encounter doing chart review, review of outside records, review of test results, interpretation of tests, patient visit, and documentation and physical exam        Debi Casey, DNP, APRN, CNM

## 2024-03-08 NOTE — PATIENT INSTRUCTIONS
"Hello!       Below are some tips for ideal feminine hygiene, to avoid perineal and vaginal infections/imbalances/skin irritations.    [place] Feminine Hygiene:    Bathing and Hygiene    #1- Bathing/Cleaning  Use only water for cleaning.  If you have a removable shower head, you can direct the water straight toward your perineal/private area to clean by rinsing well with warm water.  Never douche or use soaps on your perineal area.  These wash away your body's oils, which have natural antibiotic properties, keeping the \"bad\" bacteria away and promoting \"good\" bacteria.  For the rest of your body, use a gentle non scented soap such as Dove for Sensitive Skin, Neutrogena, Basis, Aveeno, or Pears.  If you have a partner, have them use one of these soaps as well.  Do not scrub the vulvar skin with a wash cloth.  If your vulvar area is itching, burning, or otherwise irritated, try a baking soda soak.  Use lukewarm (not hot) bath water with 4-5 tablespoons of baking soda to help soothe the skin.  Soak 1-3 times a day for 10 minutes.  If you use a sitz bath, decrease baking soda to 1-2 teaspoons.  After bathing, pat the vulvar/private area dry with a towel- do not rub the skin.  Do not use lotions, gels, ointments, creams, feminine hygiene sprays, or perfumes.  These can be irritating, especially if they contain perfumes.  Small amounts of coconut oil, zinc oxide ointment, or plan petroleum jelly may be applied to your vulva to protect the skin and decrease irritation during your period or menstrual bleeding.  Do not shave with a razor, wax, or use hair removal products on the vulvar area.  You may use scissors or electric clippers to trim the pubic hair close to the vulva.  Laser hair removal is a safe option.   .  #2- Toileting/Menstruation  If urine causes burning of the skin, pour/spray lukewarm water over the vulva/urethra while urinating.  Use white, unscented toilet paper.  Do not use toilet paper with aloe or other " lotions.  Pat dry rather than wiping.  Do not use adult or baby wipes.  These can be irritating, especially if they contain perfumes.   You can use Tucks pads if needed or desired.  Witch Hazel is helpful and healing for skin tissue, and can soothe irritation.  Do not use deodorized pads or tampons.  Only use tampons when the blood flow is heavy enough to soak one tampon in four hours or less.  Wearing them too long or when the blood flow is light may result in vaginal infection, increased discharge, unpleasant odor, or toxic shock syndrome.  Use only pads with a cotton liner, such as Stayfree, [place], or [employer] Generation.  Pads with nylon mesh weave traps moisture and keeps irritating blood and discharge against your skin longer.  Consider trying cotton (reusable) pads to see if they are less irritating.      #3- Daily care.   Keep your perineal area cool and dry as possible, as warm, wet environments favor undesirable yeast/bacteria/skin irritation.    Use cotton fabrics (underwear, pantyliners, pads) whenever possible.  Only wear 100% cotton underwear (or 100% cotton liner at a minimum) as this material provides better air circulation, allowing air in and moisture out.  Do not wear thongs.  Do not wear underwear to bed at night as this also allows for better air circulation (pajama bottoms or loose boxers are okay as they are loose and let air flow).  During the day, keep an extra pair of underwear with you, and change if you become damp.  Gold [place] or Zeasorb may be applied to the vulva and groin area 1-2 times per day to help absorb moisture.  Do not use powders that contain cornstarch or talcum powder.  Rinse your private area off with warm water and pat dry with a towel any time the area becomes warm and damp (such as after exercise, or on a hot, humid day).  Remove wet bathing and exercise clothing as soon as you can.  Avoid tight clothing, especially made of synthetic fabrics.  Do not wear pads on a daily  "basis if possible.  Avoid pantyhose. If you must wear them, cut the katie crotch out being sure to leave enough fabric to prevent the seam from running, or wear thigh high hose.  Dryness and irritation during intercourse can be helped by using a sterile lubricant.  Avoid water based lubricants as these tend to dry out before intercourse is over, causing small tears in the vagina and/or irritation of vulvar skin.  If needed, use coconut oil or name brand Slippery Stuff for lubrication, which will also help keep semen off the skin and decrease burning and irritation with intercourse.     #4- Birth Control  If using condoms, use ones that do not come lubricated and do not contain spermicides.  Lubricated condoms, contraceptive jellies, creams, or sponges may all cause itching and burning.  If needed, use coconut oil or name brand Slippery Stuff for lubricating dry condoms, which will also decrease burning and irritation with intercourse.  Avoid petroluem-based lubricants as these may affect the integrity of condoms and increase chance of pregnancy and sexually transmitted infections.  If desiring oral birth control pills, consider low-dose as these do not increase your chances of getting a yeast infection.    #5- Laundry  Use detergent free and clear of dyes and perfumes on all laundry that goes into your washer, every load, every time.  No substitutions.  Use 1/3-1/2 suggested amount of soap per load.  Do not use fabric softeners or sheets in the washer or dryer, even those advertised as \"free\".  If you use a shared washer/dryer (such as laundromat, apartment, or dorm), hand wash and line dry your underwear.  Use barnard balls to help soften clothes.  Avoid stain removing products, including bleach, especially on towels and underwear.  If you must use stain removers, soak and rinse in clear water anything which has had a stain removing product on it.  Then wash in regular washing cycle using detergent free and clear of " dyes and perfumes to remove as much of the product as possible.  White vinegar or lemon juice, 1/4-/13 cup per load of laundry, can be used to freshen clothes and remove oils.    #6- If you desire, you can try oral or vaginal probiotics.  We don't know for sure if this promotes healthy vaginal jeff (bacteria), but it may be beneficial, and is unlikely to be harmful.     A daily maintenance probiotic such as Fem Dophilis can be taken one tablet per day.  During an infection: Take 4 probiotic capsules daily for 2 days, then 2 capsules daily for 5 days. The probiotic should contain Lactobacillus species, and if you have a vaginal infection as well, make sure it also contains Lactobacillus reuteri and rhamnosus. Supporting your immune system, take 1000 mg vitamin C every 4 hours for 2 days, then 500 mg every 4 hours for 5 additional days.    Hope this helps!    Debi Casey, TIMBO, APRN, CNM, CLC  3/8/2024  11:19 AM    A Healthy You!    Getting and Staying Active  Why should I exercise?   Exercise, being physically active, is very important for all women.   Being active can help you:   Lose or maintain weight   Have more energy   Sleep better   Enjoy sex more   Relieve stress and think better   Lower the chance you will have heart disease, high blood pressure, and diabetes   Strengthen your bones and muscles   Have fewer hot flashes if you are older   How active do I have to be to get the bene?ts of exercise?   Studies show that as little as 15 minutes of moderate exercise--like fast walking or dancing--3 times a week can improve the health of your heart. Ifyou want to get the best benefits from exercise, try to increase your physical activity to at least 30 minutes, 5 times a week. If you have a serious health problem,be sure to talk with your health care provider before starting an exercise program.    Taking Care of your Health: Health Care Maintenance Screening recommendations  In all the things women do, taking  care of everything and everybody else, they sometimes forget to take care of themselves. This handout reviews the health screenings and vaccines that are recommended for women of all ages. Talk with yourhealth care provider about which tests and vaccines you need. The chart below lists the screening tests and vaccinations recommended for healthy women who do not have a high risk for most diseases.   The recommendations in thischart are guidelines only. For some tests and vaccines, the chart says you should talk to your health care provider.This is because the best recommendations for you depend on your personal health history. Your health care provider may suggest more frequent testing or additional tests ifyou havea higher chance of getting some diseases    Planning Your Family: Developing a Reproductive Life Plan    Planning ahead can help you avoid getting pregnant when you don t want to be pregnant and also be in good health if and when you do decide to become pregnant. Many women have at least one pregnancy in their lives, even if it was not planned. In fact, about half of all pregnancies are not planned. Getting pregnant when you did not plan it can be a problem, or it can turn into a happy event. Planning pregnancy leads to healthier pregnancies, healthier mothers, and healthier families.  Although many women want to have a family, not everyone wants to have children. More and more women are childless by choice (also known as childfree). Whether to have children is a personal choice that only you can make. It s okay not to want children! If you never want to get pregnant, it is important to make sure you always use very effective birth control, such as an intrauterine device, the birth control implant, female sterilization (having your tubes tied), or your partner having a vasectomy.    Reliable resources:    ATP IV Guidelines  Pooled Cohorts Equation Calculator  Breast Cancer Risk Calculator  FRAX Risk  "Assessment  ICSI Preventive Guidelines  Dietary Guidelines for Americans, 2010  USDA's MyPlate  ASA Prophylaxis  Lung CA Screening      American College of Nurse-Midwives (ACNM) http://www.midwife.org/; look at the informational handouts at http://www.midwife.org/Share-With-Women        Women's Health.gov:  http://www.womenshealth.gov/a-z-topics/index.html    FDA - Nutrition  www.mypyramid.gov  Under \"For Consumers,\" click on \"pregnant and breastfeeding women.\"      Vaccines : Centers for Disease Control and Prevention (CDC) http://www.cdc.gov/vaccines/     [facility] www.[employer]     When researching information on the web, question the validity of websites.  The [place].tv .gov, [employer] and.org tend to be more reliable information.  If there are a lot of advertisements, be cautious of the information provided. Stay away from blogs and chat rooms please!          Nutrition and supplements:     Daily multivitamin vitamin (with 400 - 1000 mcg folic acid).  Take with food as needed.     4-5 servings of dairy or other calcium rich foods (fish, leafy greens, soy) per day - if not, take 4333-4954 mg additional calcium (Tums, pills, chews). Take with dairy.     Vitamin D3 4000 IU geltab daily. Vitamin D rich foods: Cod Liver Oil (1Tbsp), [place], Mackerel, Tuna, fortified milk and yogurt, Beef and liver, sardines, egg, fortified cereals, swiss cheese.  Take supplements with fattiest meal.       2-3 (4) oz servings of fish, seafood, nuts (walnuts & almonds), oils, avocado per week - if not, take Omega 3 Fatty acids: DHA & IMELDA 6762-6049 mg per day.  Other names: cod liver oil, fish oil. Take with fattiest meal.           "

## 2024-03-09 LAB
C TRACH DNA SPEC QL PROBE+SIG AMP: NEGATIVE
N GONORRHOEA DNA SPEC QL NAA+PROBE: NEGATIVE

## 2024-03-12 LAB
BKR LAB AP GYN ADEQUACY: NORMAL
BKR LAB AP GYN INTERPRETATION: NORMAL
BKR LAB AP HPV REFLEX: NORMAL
BKR LAB AP LMP: NORMAL
BKR LAB AP PREVIOUS ABNORMAL: NORMAL
PATH REPORT.COMMENTS IMP SPEC: NORMAL
PATH REPORT.COMMENTS IMP SPEC: NORMAL
PATH REPORT.RELEVANT HX SPEC: NORMAL

## 2024-03-14 LAB
HUMAN PAPILLOMA VIRUS 16 DNA: NEGATIVE
HUMAN PAPILLOMA VIRUS 18 DNA: NEGATIVE
HUMAN PAPILLOMA VIRUS FINAL DIAGNOSIS: NORMAL
HUMAN PAPILLOMA VIRUS OTHER HR: NEGATIVE

## 2024-03-15 ENCOUNTER — PATIENT OUTREACH (OUTPATIENT)
Dept: MIDWIFE SERVICES | Facility: CLINIC | Age: 44
End: 2024-03-15
Payer: COMMERCIAL

## 2024-03-15 PROBLEM — R87.810 CERVICAL HIGH RISK HPV (HUMAN PAPILLOMAVIRUS) TEST POSITIVE: Status: ACTIVE | Noted: 2024-03-15

## 2024-04-01 ENCOUNTER — LAB (OUTPATIENT)
Dept: LAB | Facility: CLINIC | Age: 44
End: 2024-04-01
Payer: COMMERCIAL

## 2024-04-01 DIAGNOSIS — R73.03 PREDIABETES: Primary | ICD-10-CM

## 2024-04-01 DIAGNOSIS — Z13.1 SCREENING FOR DIABETES MELLITUS: ICD-10-CM

## 2024-04-01 DIAGNOSIS — Z13.220 LIPID SCREENING: ICD-10-CM

## 2024-04-01 DIAGNOSIS — Z11.3 ROUTINE SCREENING FOR STI (SEXUALLY TRANSMITTED INFECTION): ICD-10-CM

## 2024-04-01 LAB
CHOLEST SERPL-MCNC: 195 MG/DL
FASTING STATUS PATIENT QL REPORTED: YES
FASTING STATUS PATIENT QL REPORTED: YES
GLUCOSE SERPL-MCNC: 102 MG/DL (ref 70–99)
HBV SURFACE AG SERPL QL IA: NONREACTIVE
HCV AB SERPL QL IA: NONREACTIVE
HDLC SERPL-MCNC: 60 MG/DL
HIV 1+2 AB+HIV1 P24 AG SERPL QL IA: NONREACTIVE
LDLC SERPL CALC-MCNC: 118 MG/DL
NONHDLC SERPL-MCNC: 135 MG/DL
T PALLIDUM AB SER QL: NONREACTIVE
TRIGL SERPL-MCNC: 84 MG/DL

## 2024-04-01 PROCEDURE — 86780 TREPONEMA PALLIDUM: CPT

## 2024-04-01 PROCEDURE — 82947 ASSAY GLUCOSE BLOOD QUANT: CPT

## 2024-04-01 PROCEDURE — 80061 LIPID PANEL: CPT

## 2024-04-01 PROCEDURE — 36415 COLL VENOUS BLD VENIPUNCTURE: CPT

## 2024-04-01 PROCEDURE — 87389 HIV-1 AG W/HIV-1&-2 AB AG IA: CPT

## 2024-04-01 PROCEDURE — 86803 HEPATITIS C AB TEST: CPT

## 2024-04-01 PROCEDURE — 87340 HEPATITIS B SURFACE AG IA: CPT

## 2024-04-10 ENCOUNTER — MYC MEDICAL ADVICE (OUTPATIENT)
Dept: MIDWIFE SERVICES | Facility: CLINIC | Age: 44
End: 2024-04-10
Payer: COMMERCIAL

## 2024-04-10 DIAGNOSIS — R42 DIZZINESS: Primary | ICD-10-CM

## 2024-04-30 DIAGNOSIS — F33.1 MODERATE EPISODE OF RECURRENT MAJOR DEPRESSIVE DISORDER (H): ICD-10-CM

## 2024-04-30 RX ORDER — VENLAFAXINE HYDROCHLORIDE 150 MG/1
150 CAPSULE, EXTENDED RELEASE ORAL DAILY
Qty: 90 CAPSULE | Refills: 0 | Status: SHIPPED | OUTPATIENT
Start: 2024-04-30 | End: 2024-08-08

## 2024-05-15 ENCOUNTER — LAB (OUTPATIENT)
Dept: LAB | Facility: CLINIC | Age: 44
End: 2024-05-15
Payer: COMMERCIAL

## 2024-05-15 DIAGNOSIS — R42 DIZZINESS: ICD-10-CM

## 2024-05-15 LAB
ERYTHROCYTE [DISTWIDTH] IN BLOOD BY AUTOMATED COUNT: 13.3 % (ref 10–15)
HBA1C MFR BLD: 5 % (ref 0–5.6)
HCT VFR BLD AUTO: 40 % (ref 35–47)
HGB BLD-MCNC: 13.9 G/DL (ref 11.7–15.7)
MCH RBC QN AUTO: 30.3 PG (ref 26.5–33)
MCHC RBC AUTO-ENTMCNC: 34.8 G/DL (ref 31.5–36.5)
MCV RBC AUTO: 87 FL (ref 78–100)
PLATELET # BLD AUTO: 398 10E3/UL (ref 150–450)
RBC # BLD AUTO: 4.59 10E6/UL (ref 3.8–5.2)
WBC # BLD AUTO: 4.3 10E3/UL (ref 4–11)

## 2024-05-15 PROCEDURE — 85730 THROMBOPLASTIN TIME PARTIAL: CPT

## 2024-05-15 PROCEDURE — 80053 COMPREHEN METABOLIC PANEL: CPT

## 2024-05-15 PROCEDURE — 85027 COMPLETE CBC AUTOMATED: CPT

## 2024-05-15 PROCEDURE — 83036 HEMOGLOBIN GLYCOSYLATED A1C: CPT

## 2024-05-15 PROCEDURE — 36415 COLL VENOUS BLD VENIPUNCTURE: CPT

## 2024-05-15 PROCEDURE — 85390 FIBRINOLYSINS SCREEN I&R: CPT | Performed by: PATHOLOGY

## 2024-05-15 PROCEDURE — 85613 RUSSELL VIPER VENOM DILUTED: CPT

## 2024-05-16 LAB
ALBUMIN SERPL BCG-MCNC: 4.7 G/DL (ref 3.5–5.2)
ALP SERPL-CCNC: 52 U/L (ref 40–150)
ALT SERPL W P-5'-P-CCNC: 14 U/L (ref 0–50)
ANION GAP SERPL CALCULATED.3IONS-SCNC: 12 MMOL/L (ref 7–15)
AST SERPL W P-5'-P-CCNC: 18 U/L (ref 0–45)
BILIRUB SERPL-MCNC: 0.4 MG/DL
BUN SERPL-MCNC: 10.1 MG/DL (ref 6–20)
CALCIUM SERPL-MCNC: 9.6 MG/DL (ref 8.6–10)
CHLORIDE SERPL-SCNC: 106 MMOL/L (ref 98–107)
CREAT SERPL-MCNC: 0.82 MG/DL (ref 0.51–0.95)
DEPRECATED HCO3 PLAS-SCNC: 22 MMOL/L (ref 22–29)
DRVVT SCREEN RATIO: <0.65
EGFRCR SERPLBLD CKD-EPI 2021: >90 ML/MIN/1.73M2
GLUCOSE SERPL-MCNC: 106 MG/DL (ref 70–99)
INR PPP: 0.98 (ref 0.85–1.15)
LA PPP-IMP: NEGATIVE
LUPUS INTERPRETATION: NORMAL
POTASSIUM SERPL-SCNC: 4.1 MMOL/L (ref 3.4–5.3)
PROT SERPL-MCNC: 7.7 G/DL (ref 6.4–8.3)
PTT RATIO: 0.92
SODIUM SERPL-SCNC: 140 MMOL/L (ref 135–145)
THROMBIN TIME: 14.6 SECONDS (ref 13–19)

## 2024-05-19 ENCOUNTER — MYC MEDICAL ADVICE (OUTPATIENT)
Dept: FAMILY MEDICINE | Facility: CLINIC | Age: 44
End: 2024-05-19
Payer: COMMERCIAL

## 2024-08-08 ENCOUNTER — OFFICE VISIT (OUTPATIENT)
Dept: FAMILY MEDICINE | Facility: CLINIC | Age: 44
End: 2024-08-08
Payer: COMMERCIAL

## 2024-08-08 VITALS
SYSTOLIC BLOOD PRESSURE: 101 MMHG | RESPIRATION RATE: 16 BRPM | OXYGEN SATURATION: 100 % | HEART RATE: 83 BPM | HEIGHT: 66 IN | BODY MASS INDEX: 22.67 KG/M2 | TEMPERATURE: 97.6 F | DIASTOLIC BLOOD PRESSURE: 71 MMHG | WEIGHT: 141.06 LBS

## 2024-08-08 DIAGNOSIS — F10.11 HISTORY OF ALCOHOL ABUSE: ICD-10-CM

## 2024-08-08 DIAGNOSIS — B00.9 HERPES: ICD-10-CM

## 2024-08-08 DIAGNOSIS — F33.1 MODERATE EPISODE OF RECURRENT MAJOR DEPRESSIVE DISORDER (H): Primary | ICD-10-CM

## 2024-08-08 PROBLEM — B97.7 HUMAN PAPILLOMA VIRUS INFECTION: Status: RESOLVED | Noted: 2021-03-30 | Resolved: 2024-08-08

## 2024-08-08 PROBLEM — G89.29 CHRONIC PAIN OF LEFT KNEE: Status: RESOLVED | Noted: 2024-03-05 | Resolved: 2024-08-08

## 2024-08-08 PROBLEM — Z87.42 HISTORY OF ABNORMAL CERVICAL PAPANICOLAOU SMEAR: Status: RESOLVED | Noted: 2021-03-30 | Resolved: 2024-08-08

## 2024-08-08 PROBLEM — M25.562 CHRONIC PAIN OF LEFT KNEE: Status: RESOLVED | Noted: 2024-03-05 | Resolved: 2024-08-08

## 2024-08-08 PROBLEM — H69.90 DYSFUNCTION OF EUSTACHIAN TUBE, UNSPECIFIED LATERALITY: Status: RESOLVED | Noted: 2023-02-03 | Resolved: 2024-08-08

## 2024-08-08 PROBLEM — L71.0 PERIORAL DERMATITIS: Status: RESOLVED | Noted: 2024-03-05 | Resolved: 2024-08-08

## 2024-08-08 PROBLEM — R73.01 ELEVATED FASTING GLUCOSE: Status: ACTIVE | Noted: 2024-04-01

## 2024-08-08 PROCEDURE — 90746 HEPB VACCINE 3 DOSE ADULT IM: CPT | Performed by: FAMILY MEDICINE

## 2024-08-08 PROCEDURE — 90471 IMMUNIZATION ADMIN: CPT | Performed by: FAMILY MEDICINE

## 2024-08-08 PROCEDURE — 96127 BRIEF EMOTIONAL/BEHAV ASSMT: CPT | Performed by: FAMILY MEDICINE

## 2024-08-08 PROCEDURE — 99214 OFFICE O/P EST MOD 30 MIN: CPT | Mod: 25 | Performed by: FAMILY MEDICINE

## 2024-08-08 RX ORDER — VENLAFAXINE HYDROCHLORIDE 150 MG/1
150 CAPSULE, EXTENDED RELEASE ORAL DAILY
Qty: 90 CAPSULE | Refills: 3 | Status: SHIPPED | OUTPATIENT
Start: 2024-08-08

## 2024-08-08 ASSESSMENT — PATIENT HEALTH QUESTIONNAIRE - PHQ9
10. IF YOU CHECKED OFF ANY PROBLEMS, HOW DIFFICULT HAVE THESE PROBLEMS MADE IT FOR YOU TO DO YOUR WORK, TAKE CARE OF THINGS AT HOME, OR GET ALONG WITH OTHER PEOPLE: SOMEWHAT DIFFICULT
SUM OF ALL RESPONSES TO PHQ QUESTIONS 1-9: 3
SUM OF ALL RESPONSES TO PHQ QUESTIONS 1-9: 3

## 2024-08-08 ASSESSMENT — ANXIETY QUESTIONNAIRES
5. BEING SO RESTLESS THAT IT IS HARD TO SIT STILL: NOT AT ALL
GAD7 TOTAL SCORE: 3
1. FEELING NERVOUS, ANXIOUS, OR ON EDGE: SEVERAL DAYS
4. TROUBLE RELAXING: SEVERAL DAYS
3. WORRYING TOO MUCH ABOUT DIFFERENT THINGS: NOT AT ALL
GAD7 TOTAL SCORE: 3
7. FEELING AFRAID AS IF SOMETHING AWFUL MIGHT HAPPEN: NOT AT ALL
8. IF YOU CHECKED OFF ANY PROBLEMS, HOW DIFFICULT HAVE THESE MADE IT FOR YOU TO DO YOUR WORK, TAKE CARE OF THINGS AT HOME, OR GET ALONG WITH OTHER PEOPLE?: SOMEWHAT DIFFICULT
GAD7 TOTAL SCORE: 3
7. FEELING AFRAID AS IF SOMETHING AWFUL MIGHT HAPPEN: NOT AT ALL
2. NOT BEING ABLE TO STOP OR CONTROL WORRYING: NOT AT ALL
IF YOU CHECKED OFF ANY PROBLEMS ON THIS QUESTIONNAIRE, HOW DIFFICULT HAVE THESE PROBLEMS MADE IT FOR YOU TO DO YOUR WORK, TAKE CARE OF THINGS AT HOME, OR GET ALONG WITH OTHER PEOPLE: SOMEWHAT DIFFICULT
6. BECOMING EASILY ANNOYED OR IRRITABLE: SEVERAL DAYS

## 2024-08-08 NOTE — PATIENT INSTRUCTIONS
100+ grams of protein per day (only one supplement per day)  800 grams of veggies/fruit day    Generally do not drink your calories.      Artifical sweetener affect on blood sugar?

## 2024-08-08 NOTE — ASSESSMENT & PLAN NOTE
Doing well currently.  Refill of 150 mg XR Effexor sent to pharmacy.  Plan recheck in 1 year.  We also discussed overall metabolic health.  She was laid off earlier this year.  Since then she has cut out sugar.  She does prioritize eating at least 1 healthy meal per day and protein.  I challenged her to add another protein meal and target a goal of 100 g of protein per day.  Labs reviewed.  Elevated fasting glucose level but hemoglobin A1c not consistent with prediabetes nor diabetes.  Add physical activity as able.

## 2024-08-08 NOTE — PROGRESS NOTES
"  Assessment & Plan   Problem List Items Addressed This Visit       Herpes    History of alcohol abuse     No alcohol since 2021.  Doing well.          Major depressive disorder, recurrent episode (H24) - Primary     Doing well currently.  Refill of 150 mg XR Effexor sent to pharmacy.  Plan recheck in 1 year.  We also discussed overall metabolic health.  She was laid off earlier this year.  Since then she has cut out sugar.  She does prioritize eating at least 1 healthy meal per day and protein.  I challenged her to add another protein meal and target a goal of 100 g of protein per day.  Labs reviewed.  Elevated fasting glucose level but hemoglobin A1c not consistent with prediabetes nor diabetes.  Add physical activity as able.         Relevant Medications    venlafaxine (EFFEXOR XR) 150 MG 24 hr capsule      The longitudinal plan of care for the diagnosis(es)/condition(s) as documented were addressed during this visit. Due to the added complexity in care, I will continue to support Leila in the subsequent management and with ongoing continuity of care.    Subjective   Leila is a 43 year old, presenting for the following health issues:  Medication Update (Effexor)        8/8/2024    10:11 AM   Additional Questions   Roomed by sac   Accompanied by self         8/8/2024    10:11 AM   Patient Reported Additional Medications   Patient reports taking the following new medications no     Mood:   - fatigue during the day.  \"I don't eat.\"    - energy level fluctuates.   - no alcohol since 2021.   - she had a substantial dinner. Some protein supplementation.  She has stopped eating sugar. She estimates that she is consuming about 1000 calories per day.   - exercise: \"I am active.\"  Examples include mowing the lawn.     - she was laid off 3 months ago     History of Present Illness       Mental Health Follow-up:  Patient presents to follow-up on Depression & Anxiety.Patient's depression since last visit has been:  " "Better  The patient is having other symptoms associated with depression.  Patient's anxiety since last visit has been:  Better  The patient is having other symptoms associated with anxiety.  Any significant life events: job concerns  Patient is feeling anxious or having panic attacks.  Patient has no concerns about alcohol or drug use.    She eats 2-3 servings of fruits and vegetables daily.She consumes 0 sweetened beverage(s) daily.She exercises with enough effort to increase her heart rate 30 to 60 minutes per day.  She exercises with enough effort to increase her heart rate 3 or less days per week.   She is taking medications regularly.         3/4/2024     9:34 AM 3/8/2024     9:09 AM 8/8/2024    10:06 AM   PHQ   PHQ-9 Total Score 3 1 3   Q9: Thoughts of better off dead/self-harm past 2 weeks Not at all Not at all Not at all         Objective    /71 (BP Location: Left arm, Patient Position: Sitting, Cuff Size: Adult Regular)   Pulse 83   Temp 97.6  F (36.4  C) (Oral)   Resp 16   Ht 1.669 m (5' 5.7\")   Wt 64 kg (141 lb 1 oz)   LMP 08/01/2024 (Exact Date)   SpO2 100%   BMI 22.98 kg/m    Body mass index is 22.98 kg/m .  Physical Exam  Nursing note reviewed.   Constitutional:       General: She is not in acute distress.     Appearance: Normal appearance. She is not ill-appearing.   HENT:      Head: Normocephalic and atraumatic.   Eyes:      Extraocular Movements: Extraocular movements intact.      Conjunctiva/sclera: Conjunctivae normal.   Pulmonary:      Effort: Pulmonary effort is normal.   Neurological:      Mental Status: She is alert and oriented to person, place, and time.   Psychiatric:         Attention and Perception: Attention normal.         Mood and Affect: Mood normal.         Speech: Speech normal.         Thought Content: Thought content normal.              Signed Electronically by: Mk Valenzuela MD    "

## 2025-02-06 ENCOUNTER — PATIENT OUTREACH (OUTPATIENT)
Dept: CARE COORDINATION | Facility: CLINIC | Age: 45
End: 2025-02-06
Payer: COMMERCIAL

## 2025-02-10 ENCOUNTER — MYC MEDICAL ADVICE (OUTPATIENT)
Dept: FAMILY MEDICINE | Facility: CLINIC | Age: 45
End: 2025-02-10
Payer: COMMERCIAL

## 2025-02-17 ENCOUNTER — PATIENT OUTREACH (OUTPATIENT)
Dept: MIDWIFE SERVICES | Facility: CLINIC | Age: 45
End: 2025-02-17
Payer: COMMERCIAL

## 2025-02-17 DIAGNOSIS — R87.810 CERVICAL HIGH RISK HPV (HUMAN PAPILLOMAVIRUS) TEST POSITIVE: Primary | ICD-10-CM

## 2025-02-20 ENCOUNTER — PATIENT OUTREACH (OUTPATIENT)
Dept: CARE COORDINATION | Facility: CLINIC | Age: 45
End: 2025-02-20
Payer: COMMERCIAL

## 2025-04-26 ENCOUNTER — HEALTH MAINTENANCE LETTER (OUTPATIENT)
Age: 45
End: 2025-04-26

## 2025-05-22 ENCOUNTER — OFFICE VISIT (OUTPATIENT)
Dept: FAMILY MEDICINE | Facility: CLINIC | Age: 45
End: 2025-05-22
Payer: COMMERCIAL

## 2025-05-22 VITALS
HEIGHT: 66 IN | SYSTOLIC BLOOD PRESSURE: 129 MMHG | TEMPERATURE: 97.7 F | RESPIRATION RATE: 10 BRPM | OXYGEN SATURATION: 81 % | WEIGHT: 155.9 LBS | HEART RATE: 101 BPM | BODY MASS INDEX: 25.05 KG/M2 | DIASTOLIC BLOOD PRESSURE: 83 MMHG

## 2025-05-22 DIAGNOSIS — R21 RASH: Primary | ICD-10-CM

## 2025-05-22 DIAGNOSIS — Z12.4 CERVICAL CANCER SCREENING: ICD-10-CM

## 2025-05-22 PROCEDURE — G2211 COMPLEX E/M VISIT ADD ON: HCPCS | Performed by: STUDENT IN AN ORGANIZED HEALTH CARE EDUCATION/TRAINING PROGRAM

## 2025-05-22 PROCEDURE — 99213 OFFICE O/P EST LOW 20 MIN: CPT | Performed by: STUDENT IN AN ORGANIZED HEALTH CARE EDUCATION/TRAINING PROGRAM

## 2025-05-22 RX ORDER — TRIAMCINOLONE ACETONIDE 0.25 MG/G
CREAM TOPICAL 2 TIMES DAILY
Qty: 80 G | Refills: 1 | Status: SHIPPED | OUTPATIENT
Start: 2025-05-22

## 2025-05-22 ASSESSMENT — PATIENT HEALTH QUESTIONNAIRE - PHQ9
SUM OF ALL RESPONSES TO PHQ QUESTIONS 1-9: 0
10. IF YOU CHECKED OFF ANY PROBLEMS, HOW DIFFICULT HAVE THESE PROBLEMS MADE IT FOR YOU TO DO YOUR WORK, TAKE CARE OF THINGS AT HOME, OR GET ALONG WITH OTHER PEOPLE: NOT DIFFICULT AT ALL
SUM OF ALL RESPONSES TO PHQ QUESTIONS 1-9: 0

## 2025-05-22 NOTE — PROGRESS NOTES
{PROVIDER CHARTING PREFERENCE:060961}    Diana Dee is a 44 year old, presenting for the following health issues:  Derm Problem and Otalgia        5/22/2025     5:12 PM   Additional Questions   Roomed by Pita CASAREZ     History of Present Illness       Reason for visit:  Skin and ear rash  Symptom onset:  3-7 days ago  Symptoms include:  Itching, slight pain  Symptom intensity:  Moderate  Symptom progression:  Staying the same  Had these symptoms before:  No   She is taking medications regularly.        {MA/LPN/RN Pre-Provider Visit Orders- hCG/UA/Strep (Optional):076518}  {SUPERLIST (Optional):324766}  {additonal problems for provider to add (Optional):628764}    {ROS Picklists (Optional):116185}      Objective    There were no vitals taken for this visit.  There is no height or weight on file to calculate BMI.  Physical Exam   {Exam List (Optional):675987}    {Diagnostic Test Results (Optional):694538}        Signed Electronically by: Vega Huffman MD  {Email feedback regarding this note to [email or web address]   :323125}   "Discovered a lump on the neck, tender to touch.  - Lump is associated with neck stiffness and soreness.  - No previous history of similar lumps or related symptoms.    Travel History  - Traveled to [place] the week before the encounter.  - Stayed in a hotel for four nights, raising concerns about potential exposure to allergens or irritants.      Objective    /83   Pulse 101   Temp 97.7  F (36.5  C) (Temporal)   Resp 10   Ht 1.676 m (5' 6\")   Wt 70.7 kg (155 lb 14.4 oz)   LMP 05/01/2025 (Approximate)   SpO2 (!) 81%   BMI 25.16 kg/m    Body mass index is 25.16 kg/m .  Physical Exam   - HEENT: Normal  - SKIN: Erythematous rash observed on the face, behind the ear and on the scalp on the R. Rash consists of a few small discrete raised red lesions, without central punctum. Erythema present behind the ear. No abnormalities in the mouth..  - LYMPHATIC: Enlarged lymph node on the neck posteriorly at the base of the scalp, mildly tender upon palpation.      Signed Electronically by: Vega Huffman MD    "

## 2025-08-16 DIAGNOSIS — F33.1 MODERATE EPISODE OF RECURRENT MAJOR DEPRESSIVE DISORDER (H): ICD-10-CM

## 2025-08-17 RX ORDER — VENLAFAXINE HYDROCHLORIDE 150 MG/1
150 CAPSULE, EXTENDED RELEASE ORAL DAILY
Qty: 90 CAPSULE | Refills: 0 | Status: SHIPPED | OUTPATIENT
Start: 2025-08-17